# Patient Record
Sex: MALE | Race: WHITE | URBAN - METROPOLITAN AREA
[De-identification: names, ages, dates, MRNs, and addresses within clinical notes are randomized per-mention and may not be internally consistent; named-entity substitution may affect disease eponyms.]

---

## 2020-12-23 ENCOUNTER — OUTPATIENT (OUTPATIENT)
Dept: OUTPATIENT SERVICES | Facility: HOSPITAL | Age: 85
LOS: 1 days | End: 2020-12-23
Payer: MEDICARE

## 2020-12-23 DIAGNOSIS — K21.9 GASTRO-ESOPHAGEAL REFLUX DISEASE WITHOUT ESOPHAGITIS: ICD-10-CM

## 2020-12-23 PROCEDURE — 74240 X-RAY XM UPR GI TRC 1CNTRST: CPT

## 2020-12-23 PROCEDURE — 74240 X-RAY XM UPR GI TRC 1CNTRST: CPT | Mod: 26

## 2020-12-23 PROCEDURE — 74220 X-RAY XM ESOPHAGUS 1CNTRST: CPT

## 2023-05-03 ENCOUNTER — INPATIENT (INPATIENT)
Facility: HOSPITAL | Age: 88
LOS: 12 days | Discharge: HOME | DRG: 881 | End: 2023-05-16
Attending: PSYCHIATRY & NEUROLOGY | Admitting: PSYCHIATRY & NEUROLOGY
Payer: MEDICARE

## 2023-05-03 VITALS — WEIGHT: 145.06 LBS | HEIGHT: 68 IN | RESPIRATION RATE: 14 BRPM | TEMPERATURE: 98 F | OXYGEN SATURATION: 100 %

## 2023-05-03 DIAGNOSIS — R45.851 SUICIDAL IDEATIONS: ICD-10-CM

## 2023-05-03 PROCEDURE — 82607 VITAMIN B-12: CPT

## 2023-05-03 PROCEDURE — 85730 THROMBOPLASTIN TIME PARTIAL: CPT

## 2023-05-03 PROCEDURE — 83735 ASSAY OF MAGNESIUM: CPT

## 2023-05-03 PROCEDURE — 97116 GAIT TRAINING THERAPY: CPT | Mod: GP

## 2023-05-03 PROCEDURE — 82306 VITAMIN D 25 HYDROXY: CPT

## 2023-05-03 PROCEDURE — 85610 PROTHROMBIN TIME: CPT

## 2023-05-03 PROCEDURE — 83036 HEMOGLOBIN GLYCOSYLATED A1C: CPT

## 2023-05-03 PROCEDURE — 36415 COLL VENOUS BLD VENIPUNCTURE: CPT

## 2023-05-03 PROCEDURE — 80053 COMPREHEN METABOLIC PANEL: CPT

## 2023-05-03 PROCEDURE — 84443 ASSAY THYROID STIM HORMONE: CPT

## 2023-05-03 PROCEDURE — 97162 PT EVAL MOD COMPLEX 30 MIN: CPT | Mod: GP

## 2023-05-03 PROCEDURE — 80061 LIPID PANEL: CPT

## 2023-05-03 PROCEDURE — 85025 COMPLETE CBC W/AUTO DIFF WBC: CPT

## 2023-05-03 PROCEDURE — 99223 1ST HOSP IP/OBS HIGH 75: CPT

## 2023-05-03 PROCEDURE — 82746 ASSAY OF FOLIC ACID SERUM: CPT

## 2023-05-03 PROCEDURE — 70450 CT HEAD/BRAIN W/O DYE: CPT

## 2023-05-03 PROCEDURE — 93005 ELECTROCARDIOGRAM TRACING: CPT

## 2023-05-03 RX ORDER — METOPROLOL TARTRATE 50 MG
25 TABLET ORAL DAILY
Refills: 0 | Status: DISCONTINUED | OUTPATIENT
Start: 2023-05-03 | End: 2023-05-16

## 2023-05-03 RX ORDER — CLOPIDOGREL BISULFATE 75 MG/1
75 TABLET, FILM COATED ORAL DAILY
Refills: 0 | Status: DISCONTINUED | OUTPATIENT
Start: 2023-05-03 | End: 2023-05-16

## 2023-05-03 RX ORDER — TRAZODONE HCL 50 MG
25 TABLET ORAL AT BEDTIME
Refills: 0 | Status: DISCONTINUED | OUTPATIENT
Start: 2023-05-03 | End: 2023-05-05

## 2023-05-03 RX ORDER — FINASTERIDE 5 MG/1
5 TABLET, FILM COATED ORAL DAILY
Refills: 0 | Status: DISCONTINUED | OUTPATIENT
Start: 2023-05-03 | End: 2023-05-16

## 2023-05-03 RX ORDER — SIMVASTATIN 20 MG/1
40 TABLET, FILM COATED ORAL AT BEDTIME
Refills: 0 | Status: DISCONTINUED | OUTPATIENT
Start: 2023-05-03 | End: 2023-05-16

## 2023-05-03 RX ORDER — TAMSULOSIN HYDROCHLORIDE 0.4 MG/1
0.4 CAPSULE ORAL AT BEDTIME
Refills: 0 | Status: DISCONTINUED | OUTPATIENT
Start: 2023-05-03 | End: 2023-05-16

## 2023-05-03 RX ORDER — HYDROXYZINE HCL 10 MG
25 TABLET ORAL THREE TIMES A DAY
Refills: 0 | Status: DISCONTINUED | OUTPATIENT
Start: 2023-05-03 | End: 2023-05-05

## 2023-05-03 RX ORDER — SERTRALINE 25 MG/1
25 TABLET, FILM COATED ORAL AT BEDTIME
Refills: 0 | Status: DISCONTINUED | OUTPATIENT
Start: 2023-05-03 | End: 2023-05-05

## 2023-05-03 RX ORDER — APIXABAN 2.5 MG/1
2.5 TABLET, FILM COATED ORAL EVERY 12 HOURS
Refills: 0 | Status: DISCONTINUED | OUTPATIENT
Start: 2023-05-03 | End: 2023-05-16

## 2023-05-03 RX ADMIN — TAMSULOSIN HYDROCHLORIDE 0.4 MILLIGRAM(S): 0.4 CAPSULE ORAL at 21:22

## 2023-05-03 RX ADMIN — SIMVASTATIN 40 MILLIGRAM(S): 20 TABLET, FILM COATED ORAL at 21:23

## 2023-05-03 RX ADMIN — APIXABAN 2.5 MILLIGRAM(S): 2.5 TABLET, FILM COATED ORAL at 21:25

## 2023-05-03 RX ADMIN — CLOPIDOGREL BISULFATE 75 MILLIGRAM(S): 75 TABLET, FILM COATED ORAL at 21:36

## 2023-05-03 RX ADMIN — SERTRALINE 25 MILLIGRAM(S): 25 TABLET, FILM COATED ORAL at 21:21

## 2023-05-03 RX ADMIN — Medication 25 MILLIGRAM(S): at 21:23

## 2023-05-03 NOTE — BH INPATIENT PSYCHIATRY ASSESSMENT NOTE - CURRENT MEDICATION
MEDICATIONS  (STANDING):  apixaban 2.5 milliGRAM(s) Oral every 12 hours  clopidogrel Tablet 75 milliGRAM(s) Oral daily  finasteride 5 milliGRAM(s) Oral daily  metoprolol succinate ER 25 milliGRAM(s) Oral daily  sertraline 25 milliGRAM(s) Oral at bedtime  simvastatin 40 milliGRAM(s) Oral at bedtime  tamsulosin 0.4 milliGRAM(s) Oral at bedtime  traZODone 25 milliGRAM(s) Oral at bedtime    MEDICATIONS  (PRN):  hydrOXYzine hydrochloride 25 milliGRAM(s) Oral three times a day PRN anxiety

## 2023-05-03 NOTE — BH SAFETY PLAN - WARNING SIGN 1
Triggers to suicidal thoughts: Everything became too much. I couldn't function. I couldn't cook for her and that bothered me.   Wife has dementia and I take care of her. Stress from my car and selling the house.

## 2023-05-03 NOTE — BH PATIENT PROFILE - FALL HARM RISK - RISK INTERVENTIONS
Assistance OOB with selected safe patient handling equipment/Assistance with ambulation/Communicate Fall Risk and Risk Factors to all staff, patient, and family/Discuss with provider need for PT consult/Monitor gait and stability/Reinforce activity limits and safety measures with patient and family/Sit up slowly, dangle for a short time, stand at bedside before walking/Visual Cue: Yellow wristband/Bed in lowest position, wheels locked, appropriate side rails in place/Call bell, personal items and telephone in reach/Instruct patient to call for assistance before getting out of bed or chair/Non-slip footwear when patient is out of bed/[place] to call system/Physically safe environment - no spills, clutter or unnecessary equipment/Purposeful Proactive Rounding/Room/bathroom lighting operational, light cord in reach

## 2023-05-03 NOTE — BH INPATIENT PSYCHIATRY ASSESSMENT NOTE - NSBHASSESSSUMMFT_PSY_ALL_CORE
89 yr old  male with hx of prior SA now with increased anxiety , depression in the context of caring for his wife with dementia and of his gradual lost of independence and failing health.

## 2023-05-03 NOTE — BH INPATIENT PSYCHIATRY ASSESSMENT NOTE - NSBHCHARTREVIEWVS_PSY_A_CORE FT
Vital Signs Last 24 Hrs  T(C): 36.8 (05-03-23 @ 15:05), Max: 36.8 (05-03-23 @ 15:05)  T(F): 98.2 (05-03-23 @ 15:05), Max: 98.2 (05-03-23 @ 15:05)  HR: --  BP: --  BP(mean): --  RR: 14 (05-03-23 @ 15:05) (14 - 14)  SpO2: 100% (05-03-23 @ 15:05) (100% - 100%)    Orthostatic VS  05-03-23 @ 15:05  Lying BP: --/-- HR: --  Sitting BP: 125/77 HR: 69  Standing BP: 100/64 HR: 83  Site: --  Mode: --

## 2023-05-03 NOTE — BH INPATIENT PSYCHIATRY ASSESSMENT NOTE - RISK ASSESSMENT
acute :moderate  with depressed and anxious mood,   mitigating: pt denies any intent while in the hospital   protective: cares for wife with dementia , has adult children  chronic: prior psych hospitalization , SA, wife with dementia

## 2023-05-03 NOTE — BH INPATIENT PSYCHIATRY ASSESSMENT NOTE - HPI (INCLUDE ILLNESS QUALITY, SEVERITY, DURATION, TIMING, CONTEXT, MODIFYING FACTORS, ASSOCIATED SIGNS AND SYMPTOMS)
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 ,Pt was being psych cleared to return home, but the pt reported to  the ED RN that "he would kill himself if he is discharged." The daughter reportedly said to the ED staff that "pt had a suicide attempt 15 yrs ago by OD on medication and was hospitalized at [facility]. Pt found to be oriented to self, place , knows who the president of the [place] , denies any hallucinations, no delusions elicited. Pt endorses having suicidal ideation as he cannot change the stressors in his life such as his wife with dementia, his declining daily function, decreased independence and not having much help to function on a daily basis.   Pt with hx of CV stroke x3, atrial fib, colon cancer, CAD, esophageal stenosis reflux esophagitis, thoracic ascending aortic aneurysm 4.8cm, TIA with right arm weakness 2013aller: Jenny.

## 2023-05-04 DIAGNOSIS — Z96.642 PRESENCE OF LEFT ARTIFICIAL HIP JOINT: Chronic | ICD-10-CM

## 2023-05-04 LAB
A1C WITH ESTIMATED AVERAGE GLUCOSE RESULT: 5.6 % — SIGNIFICANT CHANGE UP (ref 4–5.6)
ALBUMIN SERPL ELPH-MCNC: 3.2 G/DL — LOW (ref 3.3–5)
ALP SERPL-CCNC: 49 U/L — SIGNIFICANT CHANGE UP (ref 40–120)
ALT FLD-CCNC: 13 U/L — SIGNIFICANT CHANGE UP (ref 12–78)
ANION GAP SERPL CALC-SCNC: 4 MMOL/L — LOW (ref 5–17)
APTT BLD: 29 SEC — SIGNIFICANT CHANGE UP (ref 27.5–35.5)
AST SERPL-CCNC: 13 U/L — LOW (ref 15–37)
BASOPHILS # BLD AUTO: 0.04 K/UL — SIGNIFICANT CHANGE UP (ref 0–0.2)
BASOPHILS NFR BLD AUTO: 0.7 % — SIGNIFICANT CHANGE UP (ref 0–2)
BILIRUB SERPL-MCNC: 0.5 MG/DL — SIGNIFICANT CHANGE UP (ref 0.2–1.2)
BUN SERPL-MCNC: 36 MG/DL — HIGH (ref 7–23)
CALCIUM SERPL-MCNC: 8.7 MG/DL — SIGNIFICANT CHANGE UP (ref 8.5–10.1)
CHLORIDE SERPL-SCNC: 109 MMOL/L — HIGH (ref 96–108)
CHOLEST SERPL-MCNC: 139 MG/DL — SIGNIFICANT CHANGE UP
CO2 SERPL-SCNC: 25 MMOL/L — SIGNIFICANT CHANGE UP (ref 22–31)
CREAT SERPL-MCNC: 2.3 MG/DL — HIGH (ref 0.5–1.3)
EGFR: 26 ML/MIN/1.73M2 — LOW
EOSINOPHIL # BLD AUTO: 0.14 K/UL — SIGNIFICANT CHANGE UP (ref 0–0.5)
EOSINOPHIL NFR BLD AUTO: 2.5 % — SIGNIFICANT CHANGE UP (ref 0–6)
ESTIMATED AVERAGE GLUCOSE: 114 MG/DL — SIGNIFICANT CHANGE UP (ref 68–114)
GLUCOSE SERPL-MCNC: 141 MG/DL — HIGH (ref 70–99)
HCT VFR BLD CALC: 39.1 % — SIGNIFICANT CHANGE UP (ref 39–50)
HDLC SERPL-MCNC: 52 MG/DL — SIGNIFICANT CHANGE UP
HGB BLD-MCNC: 12.6 G/DL — LOW (ref 13–17)
IMM GRANULOCYTES NFR BLD AUTO: 0.2 % — SIGNIFICANT CHANGE UP (ref 0–0.9)
INR BLD: 1.14 RATIO — SIGNIFICANT CHANGE UP (ref 0.88–1.16)
LIPID PNL WITH DIRECT LDL SERPL: 72 MG/DL — SIGNIFICANT CHANGE UP
LYMPHOCYTES # BLD AUTO: 1.27 K/UL — SIGNIFICANT CHANGE UP (ref 1–3.3)
LYMPHOCYTES # BLD AUTO: 22.3 % — SIGNIFICANT CHANGE UP (ref 13–44)
MAGNESIUM SERPL-MCNC: 2.4 MG/DL — SIGNIFICANT CHANGE UP (ref 1.6–2.6)
MCHC RBC-ENTMCNC: 32.2 GM/DL — SIGNIFICANT CHANGE UP (ref 32–36)
MCHC RBC-ENTMCNC: 32.7 PG — SIGNIFICANT CHANGE UP (ref 27–34)
MCV RBC AUTO: 101.6 FL — HIGH (ref 80–100)
MONOCYTES # BLD AUTO: 0.41 K/UL — SIGNIFICANT CHANGE UP (ref 0–0.9)
MONOCYTES NFR BLD AUTO: 7.2 % — SIGNIFICANT CHANGE UP (ref 2–14)
NEUTROPHILS # BLD AUTO: 3.83 K/UL — SIGNIFICANT CHANGE UP (ref 1.8–7.4)
NEUTROPHILS NFR BLD AUTO: 67.1 % — SIGNIFICANT CHANGE UP (ref 43–77)
NON HDL CHOLESTEROL: 87 MG/DL — SIGNIFICANT CHANGE UP
PLATELET # BLD AUTO: 165 K/UL — SIGNIFICANT CHANGE UP (ref 150–400)
POTASSIUM SERPL-MCNC: 4.2 MMOL/L — SIGNIFICANT CHANGE UP (ref 3.5–5.3)
POTASSIUM SERPL-SCNC: 4.2 MMOL/L — SIGNIFICANT CHANGE UP (ref 3.5–5.3)
PROT SERPL-MCNC: 6.4 GM/DL — SIGNIFICANT CHANGE UP (ref 6–8.3)
PROTHROM AB SERPL-ACNC: 13.3 SEC — SIGNIFICANT CHANGE UP (ref 10.5–13.4)
RBC # BLD: 3.85 M/UL — LOW (ref 4.2–5.8)
RBC # FLD: 12.8 % — SIGNIFICANT CHANGE UP (ref 10.3–14.5)
SODIUM SERPL-SCNC: 138 MMOL/L — SIGNIFICANT CHANGE UP (ref 135–145)
TRIGL SERPL-MCNC: 77 MG/DL — SIGNIFICANT CHANGE UP
TSH SERPL-MCNC: 1.38 UU/ML — SIGNIFICANT CHANGE UP (ref 0.34–4.82)
WBC # BLD: 5.7 K/UL — SIGNIFICANT CHANGE UP (ref 3.8–10.5)
WBC # FLD AUTO: 5.7 K/UL — SIGNIFICANT CHANGE UP (ref 3.8–10.5)

## 2023-05-04 PROCEDURE — 93010 ELECTROCARDIOGRAM REPORT: CPT

## 2023-05-04 PROCEDURE — 99232 SBSQ HOSP IP/OBS MODERATE 35: CPT

## 2023-05-04 PROCEDURE — 99222 1ST HOSP IP/OBS MODERATE 55: CPT

## 2023-05-04 RX ORDER — LANOLIN ALCOHOL/MO/W.PET/CERES
5 CREAM (GRAM) TOPICAL AT BEDTIME
Refills: 0 | Status: DISCONTINUED | OUTPATIENT
Start: 2023-05-04 | End: 2023-05-09

## 2023-05-04 RX ADMIN — SIMVASTATIN 40 MILLIGRAM(S): 20 TABLET, FILM COATED ORAL at 20:35

## 2023-05-04 RX ADMIN — Medication 5 MILLIGRAM(S): at 20:34

## 2023-05-04 RX ADMIN — APIXABAN 2.5 MILLIGRAM(S): 2.5 TABLET, FILM COATED ORAL at 20:35

## 2023-05-04 RX ADMIN — APIXABAN 2.5 MILLIGRAM(S): 2.5 TABLET, FILM COATED ORAL at 10:13

## 2023-05-04 RX ADMIN — CLOPIDOGREL BISULFATE 75 MILLIGRAM(S): 75 TABLET, FILM COATED ORAL at 10:12

## 2023-05-04 RX ADMIN — FINASTERIDE 5 MILLIGRAM(S): 5 TABLET, FILM COATED ORAL at 10:13

## 2023-05-04 RX ADMIN — TAMSULOSIN HYDROCHLORIDE 0.4 MILLIGRAM(S): 0.4 CAPSULE ORAL at 20:34

## 2023-05-04 RX ADMIN — SERTRALINE 25 MILLIGRAM(S): 25 TABLET, FILM COATED ORAL at 20:35

## 2023-05-04 RX ADMIN — Medication 25 MILLIGRAM(S): at 20:35

## 2023-05-04 NOTE — DIETITIAN INITIAL EVALUATION ADULT - ADD RECOMMEND
1) Liberalize diet to regular to maximize caloric and nutrient intake.  2) Provide Ensure + HP shake BID to optimize nutritional needs (provides 350 kcal, 20g protein/ shake)  3) Encourage protein-rich foods, maximize food preferences  4) Monitor bowel movements, if no BM for >3 days, consider implementing bowel regimen.  5) MVI w/ minerals daily to ensure 100% RDA met  6) Consider adding thiamine 100 mg daily 2/2 poor PO intake/ malnutrition  7) Obtain weekly wt to track/trend changes  RD will continue to monitor PO intake, labs, hydration, and wt prn.

## 2023-05-04 NOTE — H&P ADULT - NS ATTEND AMEND GEN_ALL_CORE FT
I agree with the above assessment and plan  Medicine to follow given pt with EKG and labs pending, pt a transfer from [place] and highly uncooperative with exam and history taking.

## 2023-05-04 NOTE — DIETITIAN INITIAL EVALUATION ADULT - OTHER INFO
89 y/o male with PMHx of A. 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     Pt w/ continued decreased appetite since admission. Reports of consuming ~75% breakfast tray this morning (oatmeal, pancakes) - on DASH diet. Current wt stated at 165# however endorses possible wt loss recently. Current wt doc'd at 145#, 20# wt loss/ 12.12% wt loss x ~1 month - severe and clinically signficiant. Appeared thin and frail upon assessment. Unable to conduct NFPE 2/2 current mental status however shows visible signs of muscle/fat wasting at this time. Will trial Ensure + HP shake BID to optimize nutritional needs (provides 350 kcal, 20g protein/ shake) - pt receptive. Due to decreased appetite, suggest to lberalize diet to regular to maximize caloric and nutrient intake. See other recommendations below.

## 2023-05-04 NOTE — DIETITIAN INITIAL EVALUATION ADULT - ORAL INTAKE PTA/DIET HISTORY
Pt from home, was at [facility] and transferred to . Reports of never being a "big eater" however over the past couple weeks has noticed a decrease in appetite, likely meeting <50% ENN. Likes to drink nutrition supplement at home however unable to recall name of it

## 2023-05-04 NOTE — BH SOCIAL WORK INITIAL PSYCHOSOCIAL EVALUATION - NSHIGHRISKBEHFT_PSY_ALL_CORE
Patient has history of suicide attempt 15 years ago by OD for which he was hospitalized at [facility]

## 2023-05-04 NOTE — H&P ADULT - NSHPPHYSICALEXAM_GEN_ALL_CORE
Vital Signs Last 24 Hrs  T(C): 36.8 (03 May 2023 15:05), Max: 36.8 (03 May 2023 15:05)  T(F): 98.2 (03 May 2023 15:05), Max: 98.2 (03 May 2023 15:05)  HR: 69  BP: 125/77  RR: 14 (03 May 2023 15:05) (14 - 14)  SpO2: 100% (03 May 2023 15:05) (100% - 100%)

## 2023-05-04 NOTE — DIETITIAN INITIAL EVALUATION ADULT - REASON
NFPE inappropriate at this time 2/2 current mental state; shows visible signs of mod/sev muscle/fat wasting

## 2023-05-04 NOTE — PHYSICAL THERAPY INITIAL EVALUATION ADULT - PERTINENT HX OF CURRENT PROBLEM, REHAB EVAL
increasing difficulty caring for wife with dementia ,trouble completing ADLs including laundry, meal prep causing increasing anxiety +/- suicidal ideation

## 2023-05-04 NOTE — H&P ADULT - NSICDXPASTMEDICALHX_GEN_ALL_CORE_FT
PAST MEDICAL HISTORY:  CAD (coronary artery disease)     Cerebrovascular accident (CVA)     Colon cancer     Esophageal reflux     Esophageal stenosis     Fibrillation, atrial     History of TIAs     Hyperlipidemia     Major depression, chronic     Thoracic ascending aortic aneurysm

## 2023-05-04 NOTE — DIETITIAN INITIAL EVALUATION ADULT - MALNUTRITION
Pt meets criteria for severe protein-calorie malnutrition in context of chronic disease on social/environmental circumstances

## 2023-05-04 NOTE — BH TREATMENT PLAN - NSTXDEPRESINTERRN_PSY_ALL_CORE
Establish trust/therapeutic rapport to encourage ventilation of feelings.  Provide emotional support.  Encourage medication compliance, provide education, and monitor effects.  Encourage participation in unit activities with emphasis on coping/stress management.  Maintain safe environment.  Assess mood/suicidality Q shift, document and report changes.

## 2023-05-04 NOTE — BH INPATIENT PSYCHIATRY PROGRESS NOTE - NSBHMETABOLIC_PSY_ALL_CORE_FT
BMI: BMI (kg/m2): 22.1 (05-03-23 @ 15:05)  HbA1c:   Glucose:   BP: --  Lipid Panel: Date/Time: 05-04-23 @ 08:33  Cholesterol, Serum: 139  Direct LDL: --  HDL Cholesterol, Serum: 52  Total Cholesterol/HDL Ration Measurement: --  Triglycerides, Serum: 77

## 2023-05-04 NOTE — H&P ADULT - HISTORY OF PRESENT ILLNESS
89 y/o male with PMHx of RAND. Tori on Eliquis, CVA x 3 with residual right arm weakness, Colon cancer, CAD s/p stents 2022, Esophageal stenosis, HLD, Esophagitis, TIA, thoracic ascending aortic aneurysm ( 4.8 cm) and depression admitted to inpatient psychiatry for suicidal ideation.  Per medical records, patient was initially BIB his family to [facility] ED  with the cc of decreased daily function ie of cooking , cleaning, caring for himself and his wife, having  increased anxiety, worrying,  and depression. Pt was being psych cleared to return home, but the pt reported to  the ED RN that "he would kill himself if he is discharged."   Medicine was consulted for management of acute/ chronic medical conditions. At time of exam, pt denies any active complains. Denies CP, SOB, palpitations, cough, abd pain, fever or chills

## 2023-05-04 NOTE — BH INPATIENT PSYCHIATRY PROGRESS NOTE - NSBHFUPINTERVALHXFT_PSY_A_CORE
Pt claiming to be more anxious as he is "missing my wife" . He indicated that he was considering getting an aide but was unsuccessful in getting any help ,was having difficulty with "food shopping getting the laundry done.", was getting some  delivery but "had trouble with the microwave and was trying to portion out the food so it would be enough ." Pt declining the need for a nursing home or assessed living , and wanting to remain in his home.   Pt claiming anxiety "worrying a lot " but denies at this time any suicidal intent "if so who would care for my wife?"

## 2023-05-04 NOTE — BH SOCIAL WORK INITIAL PSYCHOSOCIAL EVALUATION - OTHER PAST PSYCHIATRIC HISTORY (INCLUDE DETAILS REGARDING ONSET, COURSE OF ILLNESS, INPATIENT/OUTPATIENT TREATMENT)
Patient was admitted to  on transfer from [facility] where he presented for evaluation of depression with suicidal thoughts. Patient is a 89 year old, , white male who stated in ED "I will kill myself if I go home." Patient is struggling with decreased ability to function on a daily basis with cooking, cleaning and shopping for himself and his wife due to his age, with the complicating and severe stressor of having to care for his wife who is struggling with dementia.  Patient with his own significant medical problems as well. Patient does not see possibility of his circumstances improving, leaving him with feelings of hopelessness, helplessness and wanting to die.

## 2023-05-04 NOTE — BH INPATIENT PSYCHIATRY PROGRESS NOTE - NSBHCHARTREVIEWVS_PSY_A_CORE FT
Vital Signs Last 24 Hrs  T(C): 36.4 (05-04-23 @ 07:55), Max: 36.8 (05-03-23 @ 15:05)  T(F): 97.6 (05-04-23 @ 07:55), Max: 98.2 (05-03-23 @ 15:05)  HR: --  BP: --  BP(mean): --  RR: 16 (05-04-23 @ 07:55) (14 - 16)  SpO2: 98% (05-04-23 @ 07:55) (98% - 100%)    Orthostatic VS  05-04-23 @ 07:55  Lying BP: --/-- HR: --  Sitting BP: 148/79 HR: 80  Standing BP: 136/88 HR: 84  Site: upper right arm  Mode: electronic  Orthostatic VS  05-03-23 @ 15:05  Lying BP: --/-- HR: --  Sitting BP: 125/77 HR: 69  Standing BP: 100/64 HR: 83  Site: --  Mode: --

## 2023-05-04 NOTE — H&P ADULT - ASSESSMENT
89 y/o male with PMHx of A. 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    # Depression   - Management per psych     # CAD s/p stents   # A. Fib   - c/w Elequis 2.5 mg q12hrs  - c/w Metoprolol XL 25 mg daily   - c/w Plavix 75 mg daily  - EKG in am     # HLD  - c/w Zocor 40 mg qhs  - Lipid panel     # BPH  - c/w Flomax and Proscar     Will sign off, reconsult PRN    D/w Dr. Bocanegra        89 y/o male with PMHx of A. 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    # Depression   - Management per psych     # CAD s/p stents   # A. Fib   - c/w Elequis 2.5 mg q12hrs  - c/w Metoprolol XL 25 mg daily   - c/w Plavix 75 mg daily  - EKG in am     # HLD  - c/w Zocor 40 mg qhs  - Lipid panel     # BPH  - c/w Flomax and Proscar     Will continue to follow to assess EKG in AM and f/u lab work    D/w Dr. Bocanegra

## 2023-05-04 NOTE — DIETITIAN INITIAL EVALUATION ADULT - SIGNS/SYMPTOMS
AEB <75% ENN x >1 month, visible signs of mod to severe muscle/fat wasting AEB <75% ENN x >1 month, 12.12% wt loss x 1 month, visible signs of mod to severe muscle/fat wasting

## 2023-05-04 NOTE — BH INPATIENT PSYCHIATRY PROGRESS NOTE - CURRENT MEDICATION
MEDICATIONS  (STANDING):  apixaban 2.5 milliGRAM(s) Oral every 12 hours  clopidogrel Tablet 75 milliGRAM(s) Oral daily  finasteride 5 milliGRAM(s) Oral daily  melatonin 5 milliGRAM(s) Oral at bedtime  metoprolol succinate ER 25 milliGRAM(s) Oral daily  sertraline 25 milliGRAM(s) Oral at bedtime  simvastatin 40 milliGRAM(s) Oral at bedtime  tamsulosin 0.4 milliGRAM(s) Oral at bedtime  traZODone 25 milliGRAM(s) Oral at bedtime    MEDICATIONS  (PRN):  hydrOXYzine hydrochloride 25 milliGRAM(s) Oral three times a day PRN anxiety

## 2023-05-04 NOTE — DIETITIAN INITIAL EVALUATION ADULT - PERTINENT LABORATORY DATA
05-04    138  |  109<H>  |  36<H>  ----------------------------<  141<H>  4.2   |  25  |  2.30<H>    Ca    8.7      04 May 2023 08:33  Mg     2.4     05-04    TPro  6.4  /  Alb  3.2<L>  /  TBili  0.5  /  DBili  x   /  AST  13<L>  /  ALT  13  /  AlkPhos  49  05-04

## 2023-05-05 PROCEDURE — 99232 SBSQ HOSP IP/OBS MODERATE 35: CPT

## 2023-05-05 RX ORDER — SERTRALINE 25 MG/1
50 TABLET, FILM COATED ORAL DAILY
Refills: 0 | Status: DISCONTINUED | OUTPATIENT
Start: 2023-05-05 | End: 2023-05-05

## 2023-05-05 RX ORDER — MIRTAZAPINE 45 MG/1
7.5 TABLET, ORALLY DISINTEGRATING ORAL AT BEDTIME
Refills: 0 | Status: DISCONTINUED | OUTPATIENT
Start: 2023-05-05 | End: 2023-05-10

## 2023-05-05 RX ADMIN — Medication 5 MILLIGRAM(S): at 20:11

## 2023-05-05 RX ADMIN — SIMVASTATIN 40 MILLIGRAM(S): 20 TABLET, FILM COATED ORAL at 21:04

## 2023-05-05 RX ADMIN — Medication 25 MILLIGRAM(S): at 07:57

## 2023-05-05 RX ADMIN — Medication 25 MILLIGRAM(S): at 09:38

## 2023-05-05 RX ADMIN — FINASTERIDE 5 MILLIGRAM(S): 5 TABLET, FILM COATED ORAL at 09:38

## 2023-05-05 RX ADMIN — CLOPIDOGREL BISULFATE 75 MILLIGRAM(S): 75 TABLET, FILM COATED ORAL at 09:38

## 2023-05-05 RX ADMIN — Medication 0.5 MILLIGRAM(S): at 10:32

## 2023-05-05 RX ADMIN — Medication 0.5 MILLIGRAM(S): at 20:13

## 2023-05-05 RX ADMIN — APIXABAN 2.5 MILLIGRAM(S): 2.5 TABLET, FILM COATED ORAL at 21:03

## 2023-05-05 RX ADMIN — APIXABAN 2.5 MILLIGRAM(S): 2.5 TABLET, FILM COATED ORAL at 09:38

## 2023-05-05 RX ADMIN — MIRTAZAPINE 7.5 MILLIGRAM(S): 45 TABLET, ORALLY DISINTEGRATING ORAL at 20:12

## 2023-05-05 RX ADMIN — TAMSULOSIN HYDROCHLORIDE 0.4 MILLIGRAM(S): 0.4 CAPSULE ORAL at 20:12

## 2023-05-05 NOTE — BH INPATIENT PSYCHIATRY PROGRESS NOTE - NSBHMETABOLIC_PSY_ALL_CORE_FT
BMI: BMI (kg/m2): 22.1 (05-03-23 @ 15:05)  HbA1c: A1C with Estimated Average Glucose Result: 5.6 % (05-04-23 @ 08:33)    Glucose:   BP: --  Lipid Panel: Date/Time: 05-04-23 @ 08:33  Cholesterol, Serum: 139  Direct LDL: --  HDL Cholesterol, Serum: 52  Total Cholesterol/HDL Ration Measurement: --  Triglycerides, Serum: 77

## 2023-05-05 NOTE — BH INPATIENT PSYCHIATRY PROGRESS NOTE - CURRENT MEDICATION
MEDICATIONS  (STANDING):  apixaban 2.5 milliGRAM(s) Oral every 12 hours  clopidogrel Tablet 75 milliGRAM(s) Oral daily  finasteride 5 milliGRAM(s) Oral daily  melatonin 5 milliGRAM(s) Oral at bedtime  metoprolol succinate ER 25 milliGRAM(s) Oral daily  sertraline 50 milliGRAM(s) Oral daily  simvastatin 40 milliGRAM(s) Oral at bedtime  tamsulosin 0.4 milliGRAM(s) Oral at bedtime  traZODone 25 milliGRAM(s) Oral at bedtime    MEDICATIONS  (PRN):  hydrOXYzine hydrochloride 25 milliGRAM(s) Oral three times a day PRN anxiety  LORazepam     Tablet 0.5 milliGRAM(s) Oral two times a day PRN anxietyanxiety

## 2023-05-05 NOTE — PROGRESS NOTE ADULT - SUBJECTIVE AND OBJECTIVE BOX
CHIEF COMPLAINT: Anxiety/overwhelmed/SI    SUBJECTIVE/SIGNIFICANT INTERVAL EVENTS/OVERNIGHT EVENTS:    5/5: Patient seen and evaluated. No complaints except for anxiety and disinterest in things he used to enjoy. Denies fever, chills, HA, changes in vision, chest pain, SOB, nausea, vomiting, abdominal pain/discomfort.     Review of Systems: 14 Point review of systems reviewed and reported as negative unless otherwise stated above    FROM H&P:  "89 y/o male with PMHx of A. Fib on Eliquis, CVA x 3 with residual right arm weakness, Colon cancer, CAD s/p stents 2022, Esophageal stenosis, HLD, Esophagitis, TIA, thoracic ascending aortic aneurysm ( 4.8 cm) and depression admitted to inpatient psychiatry for suicidal ideation.  Per medical records, patient was initially BIB his family to [facility] ED  with the cc of decreased daily function ie of cooking , cleaning, caring for himself and his wife, having  increased anxiety, worrying,  and depression. Pt was being psych cleared to return home, but the pt reported to  the ED RN that "he would kill himself if he is discharged."   Medicine was consulted for management of acute/ chronic medical conditions. At time of exam, pt denies any active complains. Denies CP, SOB, palpitations, cough, abd pain, fever or chills "    PHYSICAL EXAM:    T(C): 36.6 (05-05-23 @ 07:34), Max: 36.6 (05-05-23 @ 07:34)  HR: --  BP: --  RR: 18 (05-05-23 @ 07:34) (18 - 18)  SpO2: 99% (05-05-23 @ 07:34) (99% - 99%)    General: AAOx3; NAD  Head: AT/NC  ENT: Moist Mucous Membranes; No Injury  Eyes: EOMI; PERRL  Neck: Non-tender; No JVD  CVS: RRR, S1&S2, Murmur +  Respiratory: Lungs CTA B/L; Normal Respiratory Effort  Abdomen/GI: Soft, non-tender, non-distended, no guarding, no rebound, normal bowel sounds  Extremities: No cyanosis, No clubbing,  MSK: No CVA tenderness, Normal ROM, No injury  Neuro: AAOx3, CNII-XII grossly intact, non-focal  Psych: Appropriate, Cooperative, Depression +, Anxiety +, Denies suicidal ideation  Skin: Clean, Dry and Intact      LABS:                          12.6   5.70  )-----------( 165      ( 04 May 2023 08:33 )             39.1     05-04    138  |  109<H>  |  36<H>  ----------------------------<  141<H>  4.2   |  25  |  2.30<H>    Ca    8.7      04 May 2023 08:33  Mg     2.4     05-04    TPro  6.4  /  Alb  3.2<L>  /  TBili  0.5  /  DBili  x   /  AST  13<L>  /  ALT  13  /  AlkPhos  49  05-04      CAPILLARY BLOOD GLUCOSE        Thyroid Stimulating Hormone, Serum: 1.38 uU/mL (05.04.23 @ 08:33) A1C with Estimated Average Glucose Result: 5.6:      EKG:  < from: 12 Lead ECG (05.04.23 @ 07:22) >  Diagnosis Line Normal sinus rhythm  Left axis deviation  Pulmonary disease pattern  Incomplete left bundle branch block  ST & T wave abnormality, consider lateral ischemia  Abnormal ECG    < end of copied text >            I personally reviewed labs, imaging, ekg, orders and vitals.    Discussed case with:  [X]RN  []MAG/ZEN  [X]Patient  []Family  []Specialist:        MEDICATIONS  (STANDING):  apixaban 2.5 milliGRAM(s) Oral every 12 hours  clopidogrel Tablet 75 milliGRAM(s) Oral daily  finasteride 5 milliGRAM(s) Oral daily  melatonin 5 milliGRAM(s) Oral at bedtime  metoprolol succinate ER 25 milliGRAM(s) Oral daily  mirtazapine 7.5 milliGRAM(s) Oral at bedtime  simvastatin 40 milliGRAM(s) Oral at bedtime  tamsulosin 0.4 milliGRAM(s) Oral at bedtime    MEDICATIONS  (PRN):  LORazepam     Tablet 0.5 milliGRAM(s) Oral two times a day PRN Anxiety

## 2023-05-05 NOTE — BH INPATIENT PSYCHIATRY PROGRESS NOTE - NSBHFUPINTERVALHXFT_PSY_A_CORE
Pt is somatically preoccupied ,  Pt wanting to remain at home to care for his wife but has physical limitations and get overwhelmed and not willing to have aides come in . Pt with difficult decisions   Pt claiming the ativan was helpful at 0.5mg

## 2023-05-05 NOTE — PROGRESS NOTE ADULT - ASSESSMENT
89 y/o male with PMHx of A. 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    # Depression   - Management per psych     # CAD s/p stents . No chest pain/Angina  # A. Fib   - c/w Elequis 2.5 mg q12hrs  - c/w Metoprolol XL 25 mg daily   - c/w Plavix 75 mg daily  - EKG noted. No chest pain, SOB or symptoms of ischemic process. Outpatient follow up.     # HLD  - c/w Zocor 40 mg qhs  - Lipid panel     # BPH  - c/w Flomax and Proscar     Patient currently medically stable. Medicine/Hospitalist to sign off. Re-call prn

## 2023-05-06 PROCEDURE — 99232 SBSQ HOSP IP/OBS MODERATE 35: CPT

## 2023-05-06 PROCEDURE — 70450 CT HEAD/BRAIN W/O DYE: CPT | Mod: 26

## 2023-05-06 RX ADMIN — SIMVASTATIN 40 MILLIGRAM(S): 20 TABLET, FILM COATED ORAL at 20:43

## 2023-05-06 RX ADMIN — Medication 25 MILLIGRAM(S): at 10:45

## 2023-05-06 RX ADMIN — APIXABAN 2.5 MILLIGRAM(S): 2.5 TABLET, FILM COATED ORAL at 20:43

## 2023-05-06 RX ADMIN — TAMSULOSIN HYDROCHLORIDE 0.4 MILLIGRAM(S): 0.4 CAPSULE ORAL at 20:43

## 2023-05-06 RX ADMIN — Medication 0.5 MILLIGRAM(S): at 11:37

## 2023-05-06 RX ADMIN — CLOPIDOGREL BISULFATE 75 MILLIGRAM(S): 75 TABLET, FILM COATED ORAL at 10:47

## 2023-05-06 RX ADMIN — FINASTERIDE 5 MILLIGRAM(S): 5 TABLET, FILM COATED ORAL at 10:44

## 2023-05-06 RX ADMIN — APIXABAN 2.5 MILLIGRAM(S): 2.5 TABLET, FILM COATED ORAL at 10:45

## 2023-05-06 RX ADMIN — MIRTAZAPINE 7.5 MILLIGRAM(S): 45 TABLET, ORALLY DISINTEGRATING ORAL at 20:43

## 2023-05-06 RX ADMIN — Medication 5 MILLIGRAM(S): at 20:43

## 2023-05-06 NOTE — BH INPATIENT PSYCHIATRY PROGRESS NOTE - NSBHFUPINTERVALHXFT_PSY_A_CORE
05/06/2023: Covering MD: Patient was seen today AM, chat reviewed and discussed in team. He seemed anxious, and pre-occupied with bodily symptoms, e.g. feels nervous, feels shaky and not able to talk, but seems talking well with writer. CT-head ordered to r/o organic causes. No meds changes for now.      Pt is somatically preoccupied ,  Pt wanting to remain at home to care for his wife but has physical limitations and get overwhelmed and not willing to have aides come in . Pt with difficult decisions   Pt claiming the ativan was helpful at 0.5mg

## 2023-05-06 NOTE — BH INPATIENT PSYCHIATRY PROGRESS NOTE - NSBHCHARTREVIEWVS_PSY_A_CORE FT
Vital Signs Last 24 Hrs  T(C): --  T(F): --  HR: --  BP: --  BP(mean): --  RR: --  SpO2: --    Orthostatic VS  05-05-23 @ 07:34  Lying BP: --/-- HR: --  Sitting BP: 137/81 HR: 73  Standing BP: 146/93 HR: 108  Site: upper right arm  Mode: electronic

## 2023-05-06 NOTE — BH INPATIENT PSYCHIATRY PROGRESS NOTE - CURRENT MEDICATION
MEDICATIONS  (STANDING):  apixaban 2.5 milliGRAM(s) Oral every 12 hours  clopidogrel Tablet 75 milliGRAM(s) Oral daily  finasteride 5 milliGRAM(s) Oral daily  melatonin 5 milliGRAM(s) Oral at bedtime  metoprolol succinate ER 25 milliGRAM(s) Oral daily  mirtazapine 7.5 milliGRAM(s) Oral at bedtime  simvastatin 40 milliGRAM(s) Oral at bedtime  tamsulosin 0.4 milliGRAM(s) Oral at bedtime    MEDICATIONS  (PRN):  LORazepam     Tablet 0.5 milliGRAM(s) Oral two times a day PRN Anxiety

## 2023-05-07 LAB
24R-OH-CALCIDIOL SERPL-MCNC: 34.9 NG/ML — SIGNIFICANT CHANGE UP (ref 30–80)
FOLATE SERPL-MCNC: 9.6 NG/ML — SIGNIFICANT CHANGE UP
VIT B12 SERPL-MCNC: 453 PG/ML — SIGNIFICANT CHANGE UP (ref 232–1245)

## 2023-05-07 PROCEDURE — 99232 SBSQ HOSP IP/OBS MODERATE 35: CPT

## 2023-05-07 RX ORDER — SENNA PLUS 8.6 MG/1
2 TABLET ORAL AT BEDTIME
Refills: 0 | Status: DISCONTINUED | OUTPATIENT
Start: 2023-05-07 | End: 2023-05-16

## 2023-05-07 RX ADMIN — Medication 0.5 MILLIGRAM(S): at 09:53

## 2023-05-07 RX ADMIN — Medication 25 MILLIGRAM(S): at 09:53

## 2023-05-07 RX ADMIN — TAMSULOSIN HYDROCHLORIDE 0.4 MILLIGRAM(S): 0.4 CAPSULE ORAL at 20:51

## 2023-05-07 RX ADMIN — FINASTERIDE 5 MILLIGRAM(S): 5 TABLET, FILM COATED ORAL at 09:53

## 2023-05-07 RX ADMIN — MIRTAZAPINE 7.5 MILLIGRAM(S): 45 TABLET, ORALLY DISINTEGRATING ORAL at 20:51

## 2023-05-07 RX ADMIN — Medication 5 MILLIGRAM(S): at 20:51

## 2023-05-07 RX ADMIN — CLOPIDOGREL BISULFATE 75 MILLIGRAM(S): 75 TABLET, FILM COATED ORAL at 09:53

## 2023-05-07 RX ADMIN — SIMVASTATIN 40 MILLIGRAM(S): 20 TABLET, FILM COATED ORAL at 20:51

## 2023-05-07 RX ADMIN — APIXABAN 2.5 MILLIGRAM(S): 2.5 TABLET, FILM COATED ORAL at 20:51

## 2023-05-07 RX ADMIN — APIXABAN 2.5 MILLIGRAM(S): 2.5 TABLET, FILM COATED ORAL at 09:53

## 2023-05-07 RX ADMIN — Medication 0.5 MILLIGRAM(S): at 20:50

## 2023-05-07 RX ADMIN — SENNA PLUS 2 TABLET(S): 8.6 TABLET ORAL at 22:42

## 2023-05-07 NOTE — BH INPATIENT PSYCHIATRY PROGRESS NOTE - NSBHFUPINTERVALHXFT_PSY_A_CORE
05/06/2023: Patient was seen today AM, chart reviewed and discussed in team. Mood seems OK, not depressed or suicidal at this time. Had a good conversation, endorses that he is retired now, looks after his wife of 40 years, who has Dementia, she is ambulatory and able to taken care of herself except her memory and at times forgets to put the coffee in the pot. 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 He now feels anxious, nervous and recently sold a house in [place] he owned for 20 years and used to live there from Jan till May every year. He has 2 son and daughter, has good relation with them. No acute issues at thia time. To continue with Remeron, not S/H and seems to have lower risk for suicide at this time.        05/06/2023: Covering MD: Patient was seen today AM, chat reviewed and discussed in team. He seemed anxious, and pre-occupied with bodily symptoms, e.g. feels nervous, feels shaky and not able to talk, but seems talking well with writer. CT-head ordered to r/o organic causes. No meds changes for now.      Pt is somatically preoccupied ,  Pt wanting to remain at home to care for his wife but has physical limitations and get overwhelmed and not willing to have aides come in . Pt with difficult decisions   Pt claiming the ativan was helpful at 0.5mg

## 2023-05-08 LAB
ALBUMIN SERPL ELPH-MCNC: 2.9 G/DL — LOW (ref 3.3–5)
ALP SERPL-CCNC: 44 U/L — SIGNIFICANT CHANGE UP (ref 40–120)
ALT FLD-CCNC: 15 U/L — SIGNIFICANT CHANGE UP (ref 12–78)
ANION GAP SERPL CALC-SCNC: 2 MMOL/L — LOW (ref 5–17)
AST SERPL-CCNC: 8 U/L — LOW (ref 15–37)
BILIRUB SERPL-MCNC: 0.4 MG/DL — SIGNIFICANT CHANGE UP (ref 0.2–1.2)
BUN SERPL-MCNC: 40 MG/DL — HIGH (ref 7–23)
CALCIUM SERPL-MCNC: 8.4 MG/DL — LOW (ref 8.5–10.1)
CHLORIDE SERPL-SCNC: 114 MMOL/L — HIGH (ref 96–108)
CO2 SERPL-SCNC: 26 MMOL/L — SIGNIFICANT CHANGE UP (ref 22–31)
CREAT SERPL-MCNC: 2.32 MG/DL — HIGH (ref 0.5–1.3)
EGFR: 26 ML/MIN/1.73M2 — LOW
GLUCOSE SERPL-MCNC: 91 MG/DL — SIGNIFICANT CHANGE UP (ref 70–99)
POTASSIUM SERPL-MCNC: 5 MMOL/L — SIGNIFICANT CHANGE UP (ref 3.5–5.3)
POTASSIUM SERPL-SCNC: 5 MMOL/L — SIGNIFICANT CHANGE UP (ref 3.5–5.3)
PROT SERPL-MCNC: 5.9 GM/DL — LOW (ref 6–8.3)
SODIUM SERPL-SCNC: 142 MMOL/L — SIGNIFICANT CHANGE UP (ref 135–145)

## 2023-05-08 PROCEDURE — 99232 SBSQ HOSP IP/OBS MODERATE 35: CPT

## 2023-05-08 RX ADMIN — SIMVASTATIN 40 MILLIGRAM(S): 20 TABLET, FILM COATED ORAL at 20:18

## 2023-05-08 RX ADMIN — TAMSULOSIN HYDROCHLORIDE 0.4 MILLIGRAM(S): 0.4 CAPSULE ORAL at 20:18

## 2023-05-08 RX ADMIN — Medication 0.5 MILLIGRAM(S): at 11:20

## 2023-05-08 RX ADMIN — APIXABAN 2.5 MILLIGRAM(S): 2.5 TABLET, FILM COATED ORAL at 09:50

## 2023-05-08 RX ADMIN — CLOPIDOGREL BISULFATE 75 MILLIGRAM(S): 75 TABLET, FILM COATED ORAL at 09:50

## 2023-05-08 RX ADMIN — APIXABAN 2.5 MILLIGRAM(S): 2.5 TABLET, FILM COATED ORAL at 20:19

## 2023-05-08 RX ADMIN — SENNA PLUS 2 TABLET(S): 8.6 TABLET ORAL at 20:18

## 2023-05-08 RX ADMIN — MIRTAZAPINE 7.5 MILLIGRAM(S): 45 TABLET, ORALLY DISINTEGRATING ORAL at 20:18

## 2023-05-08 RX ADMIN — FINASTERIDE 5 MILLIGRAM(S): 5 TABLET, FILM COATED ORAL at 09:50

## 2023-05-08 NOTE — BH INPATIENT PSYCHIATRY PROGRESS NOTE - CURRENT MEDICATION
Problem: Discharge Planning:  Goal: Discharged to appropriate level of care  Description: Discharged to appropriate level of care  Outcome: Completed MEDICATIONS  (STANDING):  apixaban 2.5 milliGRAM(s) Oral every 12 hours  clopidogrel Tablet 75 milliGRAM(s) Oral daily  finasteride 5 milliGRAM(s) Oral daily  melatonin 5 milliGRAM(s) Oral at bedtime  metoprolol succinate ER 25 milliGRAM(s) Oral daily  mirtazapine 7.5 milliGRAM(s) Oral at bedtime  senna 2 Tablet(s) Oral at bedtime  simvastatin 40 milliGRAM(s) Oral at bedtime  tamsulosin 0.4 milliGRAM(s) Oral at bedtime    MEDICATIONS  (PRN):  LORazepam     Tablet 0.5 milliGRAM(s) Oral two times a day PRN Anxiety

## 2023-05-08 NOTE — BH INPATIENT PSYCHIATRY PROGRESS NOTE - NSBHFUPINTERVALHXFT_PSY_A_CORE
Pt still reluctant to accept that he is overwhelmed with caring for himself and wife by himself.  Pt family has hired an aide to help. Pt with better eye contact , remains on the remeron and is able to sleep.

## 2023-05-08 NOTE — BH INPATIENT PSYCHIATRY PROGRESS NOTE - NSBHCHARTREVIEWVS_PSY_A_CORE FT
Vital Signs Last 24 Hrs  T(C): 36.6 (05-08-23 @ 08:12), Max: 36.6 (05-08-23 @ 08:12)  T(F): 97.8 (05-08-23 @ 08:12), Max: 97.8 (05-08-23 @ 08:12)  HR: --  BP: --  BP(mean): --  RR: 18 (05-08-23 @ 08:12) (18 - 18)  SpO2: 99% (05-08-23 @ 08:12) (99% - 99%)    Orthostatic VS  05-08-23 @ 08:12  Lying BP: --/-- HR: --  Sitting BP: 147/79 HR: 64  Standing BP: 133/76 HR: 71  Site: upper right arm  Mode: electronic

## 2023-05-09 PROCEDURE — 99232 SBSQ HOSP IP/OBS MODERATE 35: CPT

## 2023-05-09 RX ORDER — POLYETHYLENE GLYCOL 3350 17 G/17G
17 POWDER, FOR SOLUTION ORAL DAILY
Refills: 0 | Status: DISCONTINUED | OUTPATIENT
Start: 2023-05-09 | End: 2023-05-16

## 2023-05-09 RX ORDER — LANOLIN ALCOHOL/MO/W.PET/CERES
3 CREAM (GRAM) TOPICAL AT BEDTIME
Refills: 0 | Status: DISCONTINUED | OUTPATIENT
Start: 2023-05-09 | End: 2023-05-10

## 2023-05-09 RX ADMIN — SENNA PLUS 2 TABLET(S): 8.6 TABLET ORAL at 21:51

## 2023-05-09 RX ADMIN — CLOPIDOGREL BISULFATE 75 MILLIGRAM(S): 75 TABLET, FILM COATED ORAL at 09:13

## 2023-05-09 RX ADMIN — POLYETHYLENE GLYCOL 3350 17 GRAM(S): 17 POWDER, FOR SOLUTION ORAL at 14:25

## 2023-05-09 RX ADMIN — MIRTAZAPINE 7.5 MILLIGRAM(S): 45 TABLET, ORALLY DISINTEGRATING ORAL at 21:50

## 2023-05-09 RX ADMIN — APIXABAN 2.5 MILLIGRAM(S): 2.5 TABLET, FILM COATED ORAL at 21:50

## 2023-05-09 RX ADMIN — Medication 0.25 MILLIGRAM(S): at 11:30

## 2023-05-09 RX ADMIN — SIMVASTATIN 40 MILLIGRAM(S): 20 TABLET, FILM COATED ORAL at 21:50

## 2023-05-09 RX ADMIN — FINASTERIDE 5 MILLIGRAM(S): 5 TABLET, FILM COATED ORAL at 09:14

## 2023-05-09 RX ADMIN — TAMSULOSIN HYDROCHLORIDE 0.4 MILLIGRAM(S): 0.4 CAPSULE ORAL at 21:51

## 2023-05-09 RX ADMIN — APIXABAN 2.5 MILLIGRAM(S): 2.5 TABLET, FILM COATED ORAL at 09:13

## 2023-05-09 NOTE — BH INPATIENT PSYCHIATRY PROGRESS NOTE - CURRENT MEDICATION
MEDICATIONS  (STANDING):  apixaban 2.5 milliGRAM(s) Oral every 12 hours  clopidogrel Tablet 75 milliGRAM(s) Oral daily  finasteride 5 milliGRAM(s) Oral daily  melatonin 3 milliGRAM(s) Oral at bedtime  metoprolol succinate ER 25 milliGRAM(s) Oral daily  mirtazapine 7.5 milliGRAM(s) Oral at bedtime  polyethylene glycol 3350 17 Gram(s) Oral daily  senna 2 Tablet(s) Oral at bedtime  simvastatin 40 milliGRAM(s) Oral at bedtime  tamsulosin 0.4 milliGRAM(s) Oral at bedtime    MEDICATIONS  (PRN):  LORazepam     Tablet 0.25 milliGRAM(s) Oral two times a day PRN Anxiety

## 2023-05-09 NOTE — BH INPATIENT PSYCHIATRY PROGRESS NOTE - NSBHCHARTREVIEWVS_PSY_A_CORE FT
Vital Signs Last 24 Hrs  T(C): 36.6 (05-09-23 @ 07:50), Max: 36.6 (05-09-23 @ 07:50)  T(F): 97.8 (05-09-23 @ 07:50), Max: 97.8 (05-09-23 @ 07:50)  HR: --  BP: --  BP(mean): --  RR: 18 (05-09-23 @ 07:50) (18 - 18)  SpO2: 99% (05-09-23 @ 07:50) (99% - 99%)    Orthostatic VS  05-09-23 @ 07:50  Lying BP: --/-- HR: --  Sitting BP: 147/72 HR: 62  Standing BP: 153/85 HR: 97  Site: upper right arm  Mode: electronic  Orthostatic VS  05-08-23 @ 08:12  Lying BP: --/-- HR: --  Sitting BP: 147/79 HR: 64  Standing BP: 133/76 HR: 71  Site: upper right arm  Mode: electronic

## 2023-05-09 NOTE — BH INPATIENT PSYCHIATRY PROGRESS NOTE - NSBHFUPINTERVALHXFT_PSY_A_CORE
05/09/2023: Patient was seen today AM, chart reviewed and discussed in team. He is visible in unit, endorses that he feels light headed, and refused to tale Metoprolol Xl 25 mg today AM, VSS are WNL Sitting -BP-144/73; P-84; Standing VSS-BP-125/75; P-106. He was hold off the Metoprolol XL due to dizzy feeling. He receives Ativan 0.5 mg Q-12, was also reduced to 0.25 mg BID for dizziness. CT-head is negative. He endorses that he felt better when he was at [facility], but does  not feel good today and endorses that he is not ready to leave. Tolerating Remeron 7.5 mg HS with good effect.    Pt still reluctant to accept that he is overwhelmed with caring for himself and wife by himself.  Pt family has hired an aide to help. Pt with better eye contact , remains on the remeron and is able to sleep.

## 2023-05-09 NOTE — BH INPATIENT PSYCHIATRY PROGRESS NOTE - NSBHCHARTREVIEWINVESTIGATE_PSY_A_CORE FT
< from: 12 Lead ECG (05.04.23 @ 07:22) >    Ventricular Rate 65 BPM    Atrial Rate 65 BPM    P-R Interval 186 ms    QRS Duration 112 ms    Q-T Interval 432 ms    QTC Calculation(Bazett) 449 ms    P Axis 83 degrees    R Axis -82 degrees    T Axis 82 degrees    Diagnosis Line Normal sinus rhythm  Left axis deviation  Pulmonary disease pattern  Incomplete left bundle branch block  ST & T wave abnormality, consider lateral ischemia  Abnormal ECG  No previous ECGs available  Confirmed by IGGY WEISS, JOSÉ MIGUEL ([id number]) on 5/4/2023 10:07:41    < end of copied text >    
< from: 12 Lead ECG (05.04.23 @ 07:22) >    Ventricular Rate 65 BPM    Atrial Rate 65 BPM    P-R Interval 186 ms    QRS Duration 112 ms    Q-T Interval 432 ms    QTC Calculation(Bazett) 449 ms    P Axis 83 degrees    R Axis -82 degrees    T Axis 82 degrees    Diagnosis Line Normal sinus rhythm  Left axis deviation  Pulmonary disease pattern  Incomplete left bundle branch block  ST & T wave abnormality, consider lateral ischemia  Abnormal ECG  No previous ECGs available  Confirmed by IGGY WEISS, JOSÉ MIGUEL ([id number]) on 5/4/2023 10:07:41 AM    < end of copied text >    
< from: 12 Lead ECG (05.04.23 @ 07:22) >    Ventricular Rate 65 BPM    Atrial Rate 65 BPM    P-R Interval 186 ms    QRS Duration 112 ms    Q-T Interval 432 ms    QTC Calculation(Bazett) 449 ms    P Axis 83 degrees    R Axis -82 degrees    T Axis 82 degrees    Diagnosis Line Normal sinus rhythm  Left axis deviation  Pulmonary disease pattern  Incomplete left bundle branch block  ST & T wave abnormality, consider lateral ischemia  Abnormal ECG  No previous ECGs available  Confirmed by IGGY WEISS, JOSÉ MIGUEL ([id number]) on 5/4/2023 10:07:41    < end of copied text >

## 2023-05-10 DIAGNOSIS — F41.1 GENERALIZED ANXIETY DISORDER: ICD-10-CM

## 2023-05-10 PROCEDURE — 99232 SBSQ HOSP IP/OBS MODERATE 35: CPT

## 2023-05-10 RX ORDER — LANOLIN ALCOHOL/MO/W.PET/CERES
3 CREAM (GRAM) TOPICAL AT BEDTIME
Refills: 0 | Status: DISCONTINUED | OUTPATIENT
Start: 2023-05-10 | End: 2023-05-16

## 2023-05-10 RX ORDER — MIRTAZAPINE 45 MG/1
15 TABLET, ORALLY DISINTEGRATING ORAL AT BEDTIME
Refills: 0 | Status: DISCONTINUED | OUTPATIENT
Start: 2023-05-10 | End: 2023-05-16

## 2023-05-10 RX ADMIN — MIRTAZAPINE 15 MILLIGRAM(S): 45 TABLET, ORALLY DISINTEGRATING ORAL at 22:31

## 2023-05-10 RX ADMIN — Medication 0.25 MILLIGRAM(S): at 08:53

## 2023-05-10 RX ADMIN — Medication 0.25 MILLIGRAM(S): at 17:15

## 2023-05-10 RX ADMIN — TAMSULOSIN HYDROCHLORIDE 0.4 MILLIGRAM(S): 0.4 CAPSULE ORAL at 22:31

## 2023-05-10 RX ADMIN — POLYETHYLENE GLYCOL 3350 17 GRAM(S): 17 POWDER, FOR SOLUTION ORAL at 08:53

## 2023-05-10 RX ADMIN — SIMVASTATIN 40 MILLIGRAM(S): 20 TABLET, FILM COATED ORAL at 22:31

## 2023-05-10 RX ADMIN — Medication 3 MILLIGRAM(S): at 22:27

## 2023-05-10 RX ADMIN — APIXABAN 2.5 MILLIGRAM(S): 2.5 TABLET, FILM COATED ORAL at 08:53

## 2023-05-10 RX ADMIN — FINASTERIDE 5 MILLIGRAM(S): 5 TABLET, FILM COATED ORAL at 08:53

## 2023-05-10 RX ADMIN — Medication 25 MILLIGRAM(S): at 08:53

## 2023-05-10 RX ADMIN — SENNA PLUS 2 TABLET(S): 8.6 TABLET ORAL at 22:31

## 2023-05-10 RX ADMIN — CLOPIDOGREL BISULFATE 75 MILLIGRAM(S): 75 TABLET, FILM COATED ORAL at 08:53

## 2023-05-10 RX ADMIN — APIXABAN 2.5 MILLIGRAM(S): 2.5 TABLET, FILM COATED ORAL at 22:27

## 2023-05-10 NOTE — BH INPATIENT PSYCHIATRY PROGRESS NOTE - CURRENT MEDICATION
MEDICATIONS  (STANDING):  apixaban 2.5 milliGRAM(s) Oral every 12 hours  clopidogrel Tablet 75 milliGRAM(s) Oral daily  finasteride 5 milliGRAM(s) Oral daily  LORazepam     Tablet 0.25 milliGRAM(s) Oral three times a day  melatonin 3 milliGRAM(s) Oral at bedtime  metoprolol succinate ER 25 milliGRAM(s) Oral daily  mirtazapine 15 milliGRAM(s) Oral at bedtime  polyethylene glycol 3350 17 Gram(s) Oral daily  senna 2 Tablet(s) Oral at bedtime  simvastatin 40 milliGRAM(s) Oral at bedtime  tamsulosin 0.4 milliGRAM(s) Oral at bedtime    MEDICATIONS  (PRN):

## 2023-05-10 NOTE — BH INPATIENT PSYCHIATRY PROGRESS NOTE - NSBHFUPINTERVALHXFT_PSY_A_CORE
Pt still with anxiety "even with the thought of having someone new in the house, how does one get used to that?" Pt claiming to be anxious with the changes due to his getting older and not able to care for himself and his wife. Pt not doing well with changes. Pt is still "worried and anxious" . Will change the ativan to standing order so that pt not needing to ask for the medication .  Will also increase the Remeron for sleep and depression .

## 2023-05-10 NOTE — BH INPATIENT PSYCHIATRY PROGRESS NOTE - NSBHCHARTREVIEWVS_PSY_A_CORE FT
Vital Signs Last 24 Hrs  T(C): 36.7 (05-10-23 @ 07:43), Max: 36.7 (05-10-23 @ 07:43)  T(F): 98.1 (05-10-23 @ 07:43), Max: 98.1 (05-10-23 @ 07:43)  HR: --  BP: --  BP(mean): --  RR: 16 (05-10-23 @ 07:43) (16 - 16)  SpO2: 100% (05-10-23 @ 07:43) (100% - 100%)    Orthostatic VS  05-10-23 @ 07:43  Lying BP: --/-- HR: --  Sitting BP: 150/83 HR: 112  Standing BP: 126/72 HR: 120  Site: upper right arm  Mode: electronic  Orthostatic VS  05-09-23 @ 07:50  Lying BP: --/-- HR: --  Sitting BP: 147/72 HR: 62  Standing BP: 153/85 HR: 97  Site: upper right arm  Mode: electronic

## 2023-05-11 PROCEDURE — 99233 SBSQ HOSP IP/OBS HIGH 50: CPT

## 2023-05-11 RX ADMIN — POLYETHYLENE GLYCOL 3350 17 GRAM(S): 17 POWDER, FOR SOLUTION ORAL at 08:44

## 2023-05-11 RX ADMIN — APIXABAN 2.5 MILLIGRAM(S): 2.5 TABLET, FILM COATED ORAL at 08:34

## 2023-05-11 RX ADMIN — CLOPIDOGREL BISULFATE 75 MILLIGRAM(S): 75 TABLET, FILM COATED ORAL at 08:46

## 2023-05-11 RX ADMIN — TAMSULOSIN HYDROCHLORIDE 0.4 MILLIGRAM(S): 0.4 CAPSULE ORAL at 20:11

## 2023-05-11 RX ADMIN — SENNA PLUS 2 TABLET(S): 8.6 TABLET ORAL at 20:11

## 2023-05-11 RX ADMIN — APIXABAN 2.5 MILLIGRAM(S): 2.5 TABLET, FILM COATED ORAL at 20:11

## 2023-05-11 RX ADMIN — Medication 0.25 MILLIGRAM(S): at 08:36

## 2023-05-11 RX ADMIN — Medication 0.25 MILLIGRAM(S): at 17:45

## 2023-05-11 RX ADMIN — MIRTAZAPINE 15 MILLIGRAM(S): 45 TABLET, ORALLY DISINTEGRATING ORAL at 20:11

## 2023-05-11 RX ADMIN — FINASTERIDE 5 MILLIGRAM(S): 5 TABLET, FILM COATED ORAL at 08:34

## 2023-05-11 RX ADMIN — SIMVASTATIN 40 MILLIGRAM(S): 20 TABLET, FILM COATED ORAL at 20:11

## 2023-05-11 NOTE — BH INPATIENT PSYCHIATRY PROGRESS NOTE - NSBHCHARTREVIEWVS_PSY_A_CORE FT
Vital Signs Last 24 Hrs  T(C): 36.7 (05-11-23 @ 07:32), Max: 36.7 (05-11-23 @ 07:32)  T(F): 98 (05-11-23 @ 07:32), Max: 98 (05-11-23 @ 07:32)  HR: --  BP: --  BP(mean): --  RR: 16 (05-11-23 @ 07:32) (16 - 16)  SpO2: 99% (05-11-23 @ 07:32) (99% - 99%)    Orthostatic VS  05-11-23 @ 07:32  Lying BP: --/-- HR: --  Sitting BP: 130/72 HR: 57  Standing BP: 134/76 HR: 66  Site: upper right arm  Mode: electronic  Orthostatic VS  05-10-23 @ 07:43  Lying BP: --/-- HR: --  Sitting BP: 150/83 HR: 112  Standing BP: 126/72 HR: 120  Site: upper right arm  Mode: electronic

## 2023-05-11 NOTE — BH TREATMENT PLAN - NSTXCAREGIVERPARTICIPATE_PSY_P_CORE
Family/Caregiver participated in development of after care plan
Family/Caregiver participated in identification of needs/problems/goals for treatment/Family/Caregiver participated in defining interventions/Family/Caregiver participated in development of after care plan

## 2023-05-11 NOTE — BH TREATMENT PLAN - NSTXCOPEINTERPR_PSY_ALL_CORE
Patient will be encouraged to attend 1-2 psych rehab groups a day to gain insight and work on coping skill development.
Encourage patient to actively participate in 1-2 psych rehab groups daily to improve coping skills.

## 2023-05-11 NOTE — BH TREATMENT PLAN - NSTXPATIENTPARTICIPATE_PSY_ALL_CORE
Patient participated in identification of needs/problems/goals for treatment/Patient participated in defining interventions/Patient participated in development of after care plan/No, patient unwilling to participate
Patient participated in identification of needs/problems/goals for treatment/Patient participated in defining interventions/Patient participated in development of after care plan

## 2023-05-11 NOTE — BH TREATMENT PLAN - NSTXDCOTHRINTERSW_PSY_ALL_CORE
Met today with patient who states he feels no better than when he came in. Expressed a lot of concerns about whether he is on the right medication. Met with pt with Dr. Foy and patient relayed concerns to the drGo stating he feels very anxious, unable to think, unable to function. Dr. BURGESS to review current treatment plan.  Spoke with patient's daughter, Dick Rico, ([phone]). She expressed concern that patient may be overusing rx medications for anxiety. He has been using his PCP for med management, Dr. Short ([phone]) and he just recently had an appt at [facility] in [place]. Will discuss above with treatment team and work with patient toward sticking with one provider and taking meds only as prescribed.
Spoke with patient's son, also Keshav Reyes [phone]. He advised that patient had started to become more depressed and anxious when they had to leave their condo in [place] where they winter early due to wife's worsening symptoms of dementia. Per son patient realized that they would  not be able to use their condo any more and would have to sell it. Since then he's become more and more anxious and overwhelmed.  Per son his stepbrother Harsha [phone] has been having prepared meals delivered to the home and they have an aide who will come 4 days/week all lined up.

## 2023-05-11 NOTE — BH TREATMENT PLAN - NSTXSLPPATGOAL_PSY_ALL_CORE
Will be able to identify and utilize 2 sleep hygiene strategies daily
Will be able to identify and utilize 2 sleep hygiene strategies daily

## 2023-05-11 NOTE — BH INPATIENT PSYCHIATRY PROGRESS NOTE - NSBHFUPINTERVALHXFT_PSY_A_CORE
Pt to continue with the added ativan 0.25mg PO three times a day. Pt is not sedated and is appearing less angst  and with improved goal directed speech. Pt denies any overwhelming depression or anxiety . Pt wiling to continue with the medications and continue with the monitoring  of mood and affect. t denies any suicidal ideation intent or plan   Pt to continue with the added ativan 0.25mg PO three times a day. Pt is not sedated and is appearing less angst  and with improved goal directed speech. Pt denies any overwhelming depression or anxiety . Pt wiling to continue with the medications and continue with the monitoring  of mood and affect. t denies any suicidal ideation intent or plan      Addendum Pt at 6pm cc of ativan at 6pm caused "too tired and dizzy" although pt seen walking around not appearing to be in distress,  Pt askeing that the ativan dose be decreased to two times a day so at Q8am and Q1pm .

## 2023-05-11 NOTE — BH TREATMENT PLAN - NSTXDCOTHRGOAL_PSY_ALL_CORE
Patient will have safe placement upon discharge from  and will have an appointment for continuing in the appropriate level of psychiatric outpatient treatment within 5 business days of d/c from .
Patient will have safe placement upon discharge from  and will have an appointment for continuing in the appropriate level of psychiatric outpatient treatment within 5 business days of d/c from .

## 2023-05-11 NOTE — BH TREATMENT PLAN - NSDCCRITERIA_PSY_ALL_CORE
pt with decreased anxiety and improved sense of hope. 
pt with decreased anxiety and improved sense of hope.

## 2023-05-11 NOTE — BH TREATMENT PLAN - NSCMSPTSTRENGTHS_PSY_ALL_CORE
Expressive of emotions/Financial stability/Intelligence/Interpersonal skills/Supportive family
Intact family/Supportive family

## 2023-05-11 NOTE — BH INPATIENT PSYCHIATRY PROGRESS NOTE - CURRENT MEDICATION
MEDICATIONS  (STANDING):  apixaban 2.5 milliGRAM(s) Oral every 12 hours  clopidogrel Tablet 75 milliGRAM(s) Oral daily  finasteride 5 milliGRAM(s) Oral daily  LORazepam     Tablet 0.25 milliGRAM(s) Oral three times a day  melatonin 3 milliGRAM(s) Oral at bedtime  metoprolol succinate ER 25 milliGRAM(s) Oral daily  mirtazapine 15 milliGRAM(s) Oral at bedtime  polyethylene glycol 3350 17 Gram(s) Oral daily  senna 2 Tablet(s) Oral at bedtime  simvastatin 40 milliGRAM(s) Oral at bedtime  tamsulosin 0.4 milliGRAM(s) Oral at bedtime    MEDICATIONS  (PRN):   MEDICATIONS  (STANDING):  apixaban 2.5 milliGRAM(s) Oral every 12 hours  clopidogrel Tablet 75 milliGRAM(s) Oral daily  finasteride 5 milliGRAM(s) Oral daily  LORazepam     Tablet 0.25 milliGRAM(s) Oral two times a day  melatonin 3 milliGRAM(s) Oral at bedtime  metoprolol succinate ER 25 milliGRAM(s) Oral daily  mirtazapine 15 milliGRAM(s) Oral at bedtime  polyethylene glycol 3350 17 Gram(s) Oral daily  senna 2 Tablet(s) Oral at bedtime  simvastatin 40 milliGRAM(s) Oral at bedtime  tamsulosin 0.4 milliGRAM(s) Oral at bedtime    MEDICATIONS  (PRN):

## 2023-05-11 NOTE — BH TREATMENT PLAN - NSTXPLANTHERAPYSESSIONSFT_PSY_ALL_CORE
05-04-23  Type of therapy: Symptom management  Type of session: Group  Level of patient participation: Attentive,Engaged,Participates  Duration of participation: 45 minutes  Therapy conducted by: Other (specify),[facility]  Therapy Summary: Patient shared that he took too many pills to sleep prior to admission.  Denies suicidal ideation.  Claims he is anxious and just sold his home in [place]. His wife is 94 years old and has dementia. Patient misses her now while he is in the hospital. Claims "my brain isn't right and I don't want to leave here until I am better".    05-04-23  Type of therapy: PHYSICAL THERAPY   Type of session: Individual  Level of patient participation: Attentive,Engaged,Participates  Duration of participation: 30 minutes  Therapy conducted by: Physical Therapist Joyec   Therapy Summary: Pt seen for IE & Rx, including gait training with and wthout RW. Pt exhibits pill-rolling tremor B hands and occasional tardive dyskinetic movement of mouth.Pt transfers IN/OOC with supervision, ambulated 200ft without device but c/o "heavy head",provided RW amb >200ft with steady gait ,moderate fwd head alignment with h/o pinched nerve L C/S January 2023.Pt waiting for dtr to visit and seems very anxious to see her ,left sitting at table in day room ,RW at his side,he will use while here  
  05-08-23  Type of therapy: Self esteem  Type of session: Group  Level of patient participation: Disruptive,Resistance to participation  Duration of participation: 30 minutes  Therapy conducted by: Psych rehab  Therapy Summary: Patient attended half of the Morning Gratitude and Goal Planning Group. Patient struggled to focus on the discussion and directive, such as interupting during the opening meditation to ask for water and in response to the writer's prompt on gratitude asked, "Why does my head feel heavy when I wake up?"     Patient also struggled to focus on the group topics and instead was focused on the temperature in the day room, which the patient brought up to the writer the day before and the writer educated the patient on the dynamics of the environmental controls for the unit.    Patient eventually got up from group 30 minutes into the group time to request an anxiety pill from the nurse's station and did not return to the group.    05-08-23  Type of therapy: Physical Therapy   Type of session: Individual  Level of patient participation: Attentive  Duration of participation: 15 minutes  Therapy conducted by: Physical Therapist Joyce   Therapy Summary: Pt rec'd sitting in day room with slumped seated posture, stands out of chair with sueprvision at RW ,ambulated in hallway with RW with slow,steady gait, moderate fwd head alignment ,sloped shoulders .Stand to sit on bed with supervision, sits unsupported at EOB >10 mins talking with PT while performing AROM exs BUE/BLEs , pt c/o feeling tired and hoped he would be able to take a nap , c/o twitching heavy eyelids , Assumes R sidelying in bed in position of comfort , seems less anxious than on initial encounter ,doing well with RW unassisted .Will progress to cane if safe    05-10-23  Type of therapy: Psychoeducation  Type of session: Group  Level of patient participation: Disruptive,Resistance to participation,Irritble and hostile.  Duration of participation: 45 minutes  Therapy conducted by: Other (erendira),[facility]  Therapy Summary: Patient irritable about being in group therapy.  Asking to switch groups and hostole towards the therapist.  "I don't want to speak about the truth today.  I have alot going on."  Patient never asked about truth or any other topic.  He was disruptive and somewhat agitated trying to go in and out of group rooms several times.  Appeared confused and not open to feedback. Left group and went back to his room. Appears depressed.    05-11-23  Type of therapy: Symptom management  Type of session: Group  Level of patient participation: Engaged,Participates  Duration of participation: 60 minutes  Therapy conducted by: Psych rehab  Therapy Summary: Patient attended group therapy. Happy to report he slept well the previous evening. Shared that his family has been working to set up an in home aide for his wife's care. Focusing on the fact that this aide will need to use the second bedroom and he will have to relocate his memorabilia. Anxious and ruminating about this. Having trouble accepting change. Education and support ongoing.

## 2023-05-12 PROCEDURE — 99233 SBSQ HOSP IP/OBS HIGH 50: CPT

## 2023-05-12 RX ADMIN — FINASTERIDE 5 MILLIGRAM(S): 5 TABLET, FILM COATED ORAL at 08:23

## 2023-05-12 RX ADMIN — MIRTAZAPINE 15 MILLIGRAM(S): 45 TABLET, ORALLY DISINTEGRATING ORAL at 20:23

## 2023-05-12 RX ADMIN — Medication 0.25 MILLIGRAM(S): at 13:00

## 2023-05-12 RX ADMIN — Medication 3 MILLIGRAM(S): at 20:23

## 2023-05-12 RX ADMIN — APIXABAN 2.5 MILLIGRAM(S): 2.5 TABLET, FILM COATED ORAL at 20:23

## 2023-05-12 RX ADMIN — Medication 0.25 MILLIGRAM(S): at 08:23

## 2023-05-12 RX ADMIN — CLOPIDOGREL BISULFATE 75 MILLIGRAM(S): 75 TABLET, FILM COATED ORAL at 08:23

## 2023-05-12 RX ADMIN — SIMVASTATIN 40 MILLIGRAM(S): 20 TABLET, FILM COATED ORAL at 20:22

## 2023-05-12 RX ADMIN — TAMSULOSIN HYDROCHLORIDE 0.4 MILLIGRAM(S): 0.4 CAPSULE ORAL at 20:23

## 2023-05-12 RX ADMIN — APIXABAN 2.5 MILLIGRAM(S): 2.5 TABLET, FILM COATED ORAL at 08:23

## 2023-05-12 RX ADMIN — POLYETHYLENE GLYCOL 3350 17 GRAM(S): 17 POWDER, FOR SOLUTION ORAL at 08:35

## 2023-05-12 RX ADMIN — SENNA PLUS 2 TABLET(S): 8.6 TABLET ORAL at 20:23

## 2023-05-12 NOTE — BH INPATIENT PSYCHIATRY PROGRESS NOTE - NSBHFUPINTERVALHXFT_PSY_A_CORE
Pt reacting to talk of him having a live in aide . Pt with usual attempt to avoid acknowledging that he is getting older and needing help in his own care and care of wife.   Spoke with the daughter who is willing to have the live in aide .  Pt needing to have adjustment to changes in his home.

## 2023-05-12 NOTE — BH INPATIENT PSYCHIATRY PROGRESS NOTE - NSBHCHARTREVIEWVS_PSY_A_CORE FT
Vital Signs Last 24 Hrs  T(C): 36.7 (05-12-23 @ 07:50), Max: 36.7 (05-12-23 @ 07:50)  T(F): 98.1 (05-12-23 @ 07:50), Max: 98.1 (05-12-23 @ 07:50)  HR: --  BP: --  BP(mean): --  RR: 16 (05-12-23 @ 07:50) (16 - 16)  SpO2: 100% (05-12-23 @ 07:50) (100% - 100%)    Orthostatic VS  05-12-23 @ 07:50  Lying BP: --/-- HR: --  Sitting BP: 164/96 HR: 85  Standing BP: 156/87 HR: 94  Site: upper right arm  Mode: electronic  Orthostatic VS  05-11-23 @ 07:32  Lying BP: --/-- HR: --  Sitting BP: 130/72 HR: 57  Standing BP: 134/76 HR: 66  Site: upper right arm  Mode: electronic

## 2023-05-12 NOTE — BH INPATIENT PSYCHIATRY PROGRESS NOTE - CURRENT MEDICATION
MEDICATIONS  (STANDING):  apixaban 2.5 milliGRAM(s) Oral every 12 hours  clopidogrel Tablet 75 milliGRAM(s) Oral daily  finasteride 5 milliGRAM(s) Oral daily  LORazepam     Tablet 0.25 milliGRAM(s) Oral <User Schedule>  melatonin 3 milliGRAM(s) Oral at bedtime  metoprolol succinate ER 25 milliGRAM(s) Oral daily  mirtazapine 15 milliGRAM(s) Oral at bedtime  polyethylene glycol 3350 17 Gram(s) Oral daily  senna 2 Tablet(s) Oral at bedtime  simvastatin 40 milliGRAM(s) Oral at bedtime  tamsulosin 0.4 milliGRAM(s) Oral at bedtime    MEDICATIONS  (PRN):

## 2023-05-13 RX ADMIN — FINASTERIDE 5 MILLIGRAM(S): 5 TABLET, FILM COATED ORAL at 09:31

## 2023-05-13 RX ADMIN — APIXABAN 2.5 MILLIGRAM(S): 2.5 TABLET, FILM COATED ORAL at 20:26

## 2023-05-13 RX ADMIN — Medication 0.25 MILLIGRAM(S): at 12:13

## 2023-05-13 RX ADMIN — CLOPIDOGREL BISULFATE 75 MILLIGRAM(S): 75 TABLET, FILM COATED ORAL at 09:31

## 2023-05-13 RX ADMIN — Medication 0.25 MILLIGRAM(S): at 09:33

## 2023-05-13 RX ADMIN — TAMSULOSIN HYDROCHLORIDE 0.4 MILLIGRAM(S): 0.4 CAPSULE ORAL at 20:26

## 2023-05-13 RX ADMIN — MIRTAZAPINE 15 MILLIGRAM(S): 45 TABLET, ORALLY DISINTEGRATING ORAL at 20:26

## 2023-05-13 RX ADMIN — APIXABAN 2.5 MILLIGRAM(S): 2.5 TABLET, FILM COATED ORAL at 09:31

## 2023-05-13 RX ADMIN — SIMVASTATIN 40 MILLIGRAM(S): 20 TABLET, FILM COATED ORAL at 20:27

## 2023-05-14 RX ADMIN — SIMVASTATIN 40 MILLIGRAM(S): 20 TABLET, FILM COATED ORAL at 20:49

## 2023-05-14 RX ADMIN — TAMSULOSIN HYDROCHLORIDE 0.4 MILLIGRAM(S): 0.4 CAPSULE ORAL at 20:48

## 2023-05-14 RX ADMIN — Medication 0.25 MILLIGRAM(S): at 12:35

## 2023-05-14 RX ADMIN — SENNA PLUS 2 TABLET(S): 8.6 TABLET ORAL at 20:48

## 2023-05-14 RX ADMIN — FINASTERIDE 5 MILLIGRAM(S): 5 TABLET, FILM COATED ORAL at 08:55

## 2023-05-14 RX ADMIN — MIRTAZAPINE 15 MILLIGRAM(S): 45 TABLET, ORALLY DISINTEGRATING ORAL at 20:48

## 2023-05-14 RX ADMIN — Medication 0.25 MILLIGRAM(S): at 09:00

## 2023-05-14 RX ADMIN — CLOPIDOGREL BISULFATE 75 MILLIGRAM(S): 75 TABLET, FILM COATED ORAL at 09:01

## 2023-05-14 RX ADMIN — APIXABAN 2.5 MILLIGRAM(S): 2.5 TABLET, FILM COATED ORAL at 20:48

## 2023-05-14 RX ADMIN — APIXABAN 2.5 MILLIGRAM(S): 2.5 TABLET, FILM COATED ORAL at 08:56

## 2023-05-15 PROCEDURE — 99232 SBSQ HOSP IP/OBS MODERATE 35: CPT

## 2023-05-15 RX ADMIN — Medication 0.25 MILLIGRAM(S): at 08:42

## 2023-05-15 RX ADMIN — Medication 3 MILLIGRAM(S): at 20:11

## 2023-05-15 RX ADMIN — SENNA PLUS 2 TABLET(S): 8.6 TABLET ORAL at 20:10

## 2023-05-15 RX ADMIN — TAMSULOSIN HYDROCHLORIDE 0.4 MILLIGRAM(S): 0.4 CAPSULE ORAL at 20:10

## 2023-05-15 RX ADMIN — Medication 25 MILLIGRAM(S): at 08:52

## 2023-05-15 RX ADMIN — SIMVASTATIN 40 MILLIGRAM(S): 20 TABLET, FILM COATED ORAL at 20:11

## 2023-05-15 RX ADMIN — Medication 0.25 MILLIGRAM(S): at 12:40

## 2023-05-15 RX ADMIN — APIXABAN 2.5 MILLIGRAM(S): 2.5 TABLET, FILM COATED ORAL at 08:44

## 2023-05-15 RX ADMIN — FINASTERIDE 5 MILLIGRAM(S): 5 TABLET, FILM COATED ORAL at 08:43

## 2023-05-15 RX ADMIN — APIXABAN 2.5 MILLIGRAM(S): 2.5 TABLET, FILM COATED ORAL at 20:11

## 2023-05-15 RX ADMIN — CLOPIDOGREL BISULFATE 75 MILLIGRAM(S): 75 TABLET, FILM COATED ORAL at 08:46

## 2023-05-15 RX ADMIN — MIRTAZAPINE 15 MILLIGRAM(S): 45 TABLET, ORALLY DISINTEGRATING ORAL at 20:11

## 2023-05-15 NOTE — BH DISCHARGE NOTE NURSING/SOCIAL WORK/PSYCH REHAB - NSDPDISTO_PSY_ALL_CORE
[address], [address]  [place] [address]  [phone]    Patient will have live-in aide arranged by his family./Home

## 2023-05-15 NOTE — BH DISCHARGE NOTE NURSING/SOCIAL WORK/PSYCH REHAB - PATIENT PORTAL LINK FT
You can access the FollowMyHealth Patient Portal offered by [facility] by registering at the following website: [email or web address]. By joining [facility]’s FollowMyHealth portal, you will also be able to view your health information using other applications (apps) compatible with our system.

## 2023-05-15 NOTE — BH INPATIENT PSYCHIATRY PROGRESS NOTE - NSBHMETABOLIC_PSY_ALL_CORE_FT
BMI: BMI (kg/m2): 22.1 (05-03-23 @ 15:05)  HbA1c: A1C with Estimated Average Glucose Result: 5.6 % (05-04-23 @ 08:33)    Glucose:   BP: --  Lipid Panel: Date/Time: 05-04-23 @ 08:33  Cholesterol, Serum: 139  Direct LDL: --  HDL Cholesterol, Serum: 52  Total Cholesterol/HDL Ration Measurement: --  Triglycerides, Serum: 77   BMI:   HbA1c:   Glucose:   BP: --  Lipid Panel:

## 2023-05-15 NOTE — BH DISCHARGE NOTE NURSING/SOCIAL WORK/PSYCH REHAB - NSCDUDCCRISIS_PSY_A_CORE
.National Suicide Prevention Lifeline 1 (223) 506-7567/.  [employer]  1 (760) LIFENET (787-0739)/.  [facility]  [phone] 118-7716/.  [facility] Behavioral Health Helpline / [facility]  [phone] 996-PGNI (1845)/.  [facility]  [address] [place] [address] [phone]   Hours:  Monday through Friday from 9 AM to 3 PM/Other.../988 Suicide and Crisis Lifeline .National Suicide Prevention Lifeline 0 (116) 199-8531/.  [facility]  1 (424) LIFENET (907-4214)/.  [facility]  [phone] 622-3326/.  [facility] Behavioral Health Helpline / [facility]  [phone] 745-NGSQ (6596)/.  [facility]  [address] [place], [place] [address] [phone]   Hours:  Monday through Friday from 9 AM to 3 PM/.  [facility] Crisis Line  7 [phone], Option 1/Other.../988 Suicide and Crisis Lifeline

## 2023-05-15 NOTE — BH DISCHARGE NOTE NURSING/SOCIAL WORK/PSYCH REHAB - NSDCADDINFO1FT_PSY_ALL_CORE
Patient will continue in Mental Health treatment with his previous provider,  primary care physician, Dr. Short, (Patient declined psychiatric referral.) Patient has an appointment to see Dr. Short on Wednesday, 5/17/2023 at 1:45PM

## 2023-05-15 NOTE — BH INPATIENT PSYCHIATRY PROGRESS NOTE - CURRENT MEDICATION
MEDICATIONS  (STANDING):  apixaban 2.5 milliGRAM(s) Oral every 12 hours  clopidogrel Tablet 75 milliGRAM(s) Oral daily  finasteride 5 milliGRAM(s) Oral daily  LORazepam     Tablet 0.25 milliGRAM(s) Oral <User Schedule>  melatonin 3 milliGRAM(s) Oral at bedtime  metoprolol succinate ER 25 milliGRAM(s) Oral daily  mirtazapine 15 milliGRAM(s) Oral at bedtime  polyethylene glycol 3350 17 Gram(s) Oral daily  senna 2 Tablet(s) Oral at bedtime  simvastatin 40 milliGRAM(s) Oral at bedtime  tamsulosin 0.4 milliGRAM(s) Oral at bedtime    MEDICATIONS  (PRN):   MEDICATIONS  (STANDING):    MEDICATIONS  (PRN):

## 2023-05-15 NOTE — BH INPATIENT PSYCHIATRY PROGRESS NOTE - NSBHFUPINTERVALHXFT_PSY_A_CORE
Pt still asking for an anticoagulant as he is believing" I have thick blood and that is causing the anxiety  attacks so I need to do some blood letting or anticoagulant. 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  Pt with decreased anxiety of returning home but is now still attributing unrealistic effects to medication usually concerning ativan and melatonin .  Pt with need for medication to be given on a set schedule so that it is not changed often according to what the pt is believing at the moment. Pt with mild to moderate cognitive changes and will be need ing the assist of his aide for medications

## 2023-05-15 NOTE — BH INPATIENT PSYCHIATRY PROGRESS NOTE - NSBHCHARTREVIEWVS_PSY_A_CORE FT
Vital Signs Last 24 Hrs  T(C): 36.6 (05-15-23 @ 08:40), Max: 36.6 (05-15-23 @ 08:40)  T(F): 97.8 (05-15-23 @ 08:40), Max: 97.8 (05-15-23 @ 08:40)  HR: --  BP: --  BP(mean): --  RR: 18 (05-15-23 @ 08:40) (18 - 18)  SpO2: 100% (05-15-23 @ 08:40) (100% - 100%)    Orthostatic VS  05-15-23 @ 08:40  Lying BP: --/-- HR: --  Sitting BP: 150/84 HR: 97  Standing BP: 138/83 HR: 103  Site: --  Mode: electronic  Orthostatic VS  05-14-23 @ 08:02  Lying BP: --/-- HR: --  Sitting BP: 154/87 HR: --  Standing BP: 157/106 HR: --  Site: upper left arm  Mode: electronic   Vital Signs Last 24 Hrs  T(C): --  T(F): --  HR: --  BP: --  BP(mean): --  RR: --  SpO2: --

## 2023-05-16 VITALS — RESPIRATION RATE: 16 BRPM | TEMPERATURE: 98 F | OXYGEN SATURATION: 100 %

## 2023-05-16 PROCEDURE — 99239 HOSP IP/OBS DSCHRG MGMT >30: CPT

## 2023-05-16 RX ORDER — MIRTAZAPINE 45 MG/1
1 TABLET, ORALLY DISINTEGRATING ORAL
Qty: 15 | Refills: 1
Start: 2023-05-16 | End: 2023-06-14

## 2023-05-16 RX ORDER — TAMSULOSIN HYDROCHLORIDE 0.4 MG/1
1 CAPSULE ORAL
Qty: 15 | Refills: 1
Start: 2023-05-16 | End: 2023-06-14

## 2023-05-16 RX ORDER — METOPROLOL TARTRATE 50 MG
1 TABLET ORAL
Qty: 15 | Refills: 1
Start: 2023-05-16 | End: 2023-06-14

## 2023-05-16 RX ORDER — CLOPIDOGREL BISULFATE 75 MG/1
1 TABLET, FILM COATED ORAL
Qty: 15 | Refills: 1
Start: 2023-05-16 | End: 2023-06-14

## 2023-05-16 RX ORDER — SIMVASTATIN 20 MG/1
1 TABLET, FILM COATED ORAL
Qty: 15 | Refills: 1
Start: 2023-05-16 | End: 2023-06-14

## 2023-05-16 RX ORDER — LANOLIN ALCOHOL/MO/W.PET/CERES
1 CREAM (GRAM) TOPICAL
Qty: 15 | Refills: 1
Start: 2023-05-16 | End: 2023-06-14

## 2023-05-16 RX ORDER — SENNA PLUS 8.6 MG/1
2 TABLET ORAL
Qty: 30 | Refills: 1
Start: 2023-05-16 | End: 2023-06-14

## 2023-05-16 RX ORDER — POLYETHYLENE GLYCOL 3350 17 G/17G
17 POWDER, FOR SOLUTION ORAL
Qty: 255 | Refills: 1
Start: 2023-05-16 | End: 2023-06-14

## 2023-05-16 RX ORDER — APIXABAN 2.5 MG/1
1 TABLET, FILM COATED ORAL
Qty: 30 | Refills: 0
Start: 2023-05-16 | End: 2023-05-30

## 2023-05-16 RX ORDER — FINASTERIDE 5 MG/1
1 TABLET, FILM COATED ORAL
Qty: 15 | Refills: 0
Start: 2023-05-16 | End: 2023-05-30

## 2023-05-16 RX ADMIN — APIXABAN 2.5 MILLIGRAM(S): 2.5 TABLET, FILM COATED ORAL at 08:27

## 2023-05-16 RX ADMIN — CLOPIDOGREL BISULFATE 75 MILLIGRAM(S): 75 TABLET, FILM COATED ORAL at 08:27

## 2023-05-16 RX ADMIN — Medication 0.25 MILLIGRAM(S): at 08:26

## 2023-05-16 RX ADMIN — Medication 0.25 MILLIGRAM(S): at 12:31

## 2023-05-16 RX ADMIN — FINASTERIDE 5 MILLIGRAM(S): 5 TABLET, FILM COATED ORAL at 08:29

## 2023-05-16 NOTE — BH INPATIENT PSYCHIATRY PROGRESS NOTE - NSBHCHARTREVIEWVS_PSY_A_CORE FT
Vital Signs Last 24 Hrs  T(C): 36.7 (05-16-23 @ 07:12), Max: 36.7 (05-16-23 @ 07:12)  T(F): 98.1 (05-16-23 @ 07:12), Max: 98.1 (05-16-23 @ 07:12)  HR: --  BP: --  BP(mean): --  RR: 16 (05-16-23 @ 07:12) (16 - 16)  SpO2: 100% (05-16-23 @ 07:12) (100% - 100%)    Orthostatic VS  05-16-23 @ 07:12  Lying BP: --/-- HR: --  Sitting BP: 156/77 HR: 70  Standing BP: 134/100 HR: 87  Site: upper right arm  Mode: electronic  Orthostatic VS  05-15-23 @ 08:40  Lying BP: --/-- HR: --  Sitting BP: 150/84 HR: 97  Standing BP: 138/83 HR: 103  Site: --  Mode: electronic

## 2023-05-16 NOTE — BH INPATIENT PSYCHIATRY PROGRESS NOTE - NSTXCOPEDATEEST_PSY_ALL_CORE
10-May-2023
03-May-2023
10-May-2023
10-May-2023
03-May-2023
03-May-2023
10-May-2023
03-May-2023
10-May-2023

## 2023-05-16 NOTE — BH INPATIENT PSYCHIATRY PROGRESS NOTE - NSTXDEPRESDATEEST_PSY_ALL_CORE
03-May-2023
15-May-2023
03-May-2023

## 2023-05-16 NOTE — BH INPATIENT PSYCHIATRY PROGRESS NOTE - NSTXANXGOAL_PSY_ALL_CORE
Be able to participate in activities despite lingering anxiety/panic
How Severe Are They?: moderate
Is This A New Presentation, Or A Follow-Up?: Skin Lesions
Be able to participate in activities despite lingering anxiety/panic
no

## 2023-05-16 NOTE — BH INPATIENT PSYCHIATRY PROGRESS NOTE - NSBHMSETHTCONTENT_PSY_A_CORE
Preoccupations/Suicidality

## 2023-05-16 NOTE — BH INPATIENT PSYCHIATRY PROGRESS NOTE - NSBHFUPINTERVALHXFT_PSY_A_CORE
Pt is aware of discharge today.  Pt with euthymic mood and affect is full and mood congruent.  Pt with goal directed speech He denies any hallucination .  Pt denies any side effects from medications. Pt willing to go home and verbalized willing to attend aftercare treatment

## 2023-05-16 NOTE — BH INPATIENT PSYCHIATRY PROGRESS NOTE - NSICDXBHSECONDARYDX_PSY_ALL_CORE
AXEL (generalized anxiety disorder)   F41.1  

## 2023-05-16 NOTE — BH INPATIENT PSYCHIATRY PROGRESS NOTE - NSTXANXINTERMD_PSY_ALL_CORE
pt started on zoloft
pt continues with Abilify for mood and affect stability 
pt continues with Abilify for mood and affect stability 
pt started on zoloft
pt started on zoloft

## 2023-05-16 NOTE — BH INPATIENT PSYCHIATRY PROGRESS NOTE - NSTXDCOTHRDATETRGT_PSY_ALL_CORE
11-May-2023

## 2023-05-16 NOTE — BH INPATIENT PSYCHIATRY DISCHARGE NOTE - NSBHDCMEDICALFT_PSY_A_CORE
hx of CV stroke x3, atrial fib, colon cancer, CAD, esophageal stenosis reflux esophagitis, thoracic ascending aortic aneurysm 4.8cm, TIA with right arm weakness 2013

## 2023-05-16 NOTE — BH INPATIENT PSYCHIATRY DISCHARGE NOTE - REASON FOR ADMISSION
89 yr old  retired domiciled  male BIB his family to [facility] ED  with the cc of decreased daily function ie of cooking , cleaning, caring for himself and his wife  ,having  increased anxiety, worrying,  and depression pt reported to  the ED RN that "he would kill himself if he is discharged. Pt with hx of CV stroke x3, atrial fib, colon cancer, CAD, esophageal stenosis reflux esophagitis, thoracic ascending aortic aneurysm 4.8cm, TIA with right arm weakness 2013aller: Jenny. Pt with hx of suicide attempt 15 yrs ago by OD on medication and was hospitalized at [facility]

## 2023-05-16 NOTE — BH INPATIENT PSYCHIATRY PROGRESS NOTE - NSTXSLPPATGOAL_PSY_ALL_CORE
Will be able to identify and utilize 2 sleep hygiene strategies daily

## 2023-05-16 NOTE — BH INPATIENT PSYCHIATRY PROGRESS NOTE - NSBHATTESTBILLING_PSY_A_CORE
Billing in another system
59512-Etsthivqkg OBS or IP - moderate complexity OR 35-49 mins
Billing in another system
Billing in another system
14283-Recttqfhfy OBS or IP - moderate complexity OR 35-49 mins
39498-Csmiwvpohe OBS or IP - moderate complexity OR 35-49 mins

## 2023-05-16 NOTE — BH INPATIENT PSYCHIATRY PROGRESS NOTE - NSBHMSEKNOWHOW_PSY_ALL_CORE
Current Events

## 2023-05-16 NOTE — BH INPATIENT PSYCHIATRY DISCHARGE NOTE - OTHER PAST PSYCHIATRIC HISTORY (INCLUDE DETAILS REGARDING ONSET, COURSE OF ILLNESS, INPATIENT/OUTPATIENT TREATMENT)
Patient was admitted to  on transfer from [facility] where he presented for evaluation of depression with suicidal thoughts. Patient is a 89 year old, , white male who stated in ED "I will kill myself if I go home." Patient is struggling with decreased ability to function on a daily basis with cooking, cleaning and shopping for himself and his wife due to his age, with the complicating and severe stressor of having to care for his wife who is struggling with dementia.  Patient with his own significant medical problems as well. Patient does not see possibility of his circumstances improving, leaving him with feelings of hopelessness, helplessness and wanting to die.

## 2023-05-16 NOTE — BH INPATIENT PSYCHIATRY PROGRESS NOTE - NSDCCRITERIA_PSY_ALL_CORE
pt with decreased anxiety and improved sense of hope. 

## 2023-05-16 NOTE — BH INPATIENT PSYCHIATRY DISCHARGE NOTE - HOSPITAL COURSE
Pt with gradual decreased anxiety and depression with Remeron.  Pt with improved appetite and sleep.  Pt denies any suicidal ideation or plan . Pt denies any side effects from medication and is with decreased stress.  Pt will be returning home and will have a live in aide which the family has obtained to care for the pt and his elderly wife.

## 2023-05-16 NOTE — BH INPATIENT PSYCHIATRY PROGRESS NOTE - NSTXSLPPATDATEEST_PSY_ALL_CORE
03-May-2023

## 2023-05-16 NOTE — BH INPATIENT PSYCHIATRY PROGRESS NOTE - NSTXDCOTHRGOAL_PSY_ALL_CORE
Patient will have safe placement upon discharge from  and will have an appointment for continuing in the appropriate level of psychiatric outpatient treatment within 5 business days of d/c from .

## 2023-05-16 NOTE — BH INPATIENT PSYCHIATRY PROGRESS NOTE - NSTXDEPRESDATETRGT_PSY_ALL_CORE
10-May-2023
22-May-2023
10-May-2023

## 2023-05-16 NOTE — BH INPATIENT PSYCHIATRY PROGRESS NOTE - NSTXCOPEDATETRGT_PSY_ALL_CORE
17-May-2023
17-May-2023
10-May-2023
17-May-2023
10-May-2023
17-May-2023
10-May-2023
10-May-2023
17-May-2023
10-May-2023
10-May-2023

## 2023-05-16 NOTE — BH INPATIENT PSYCHIATRY DISCHARGE NOTE - NSDCMRMEDTOKEN_GEN_ALL_CORE_FT
apixaban 2.5 mg oral tablet: 1 tab(s) orally every 12 hours  clopidogrel 75 mg oral tablet: 1 tab(s) orally once a day MDD: 75mg  finasteride 5 mg oral tablet: 1 tab(s) orally once a day  melatonin 3 mg oral tablet: 1 tab(s) orally once a day (at bedtime)  metoprolol succinate 25 mg oral tablet, extended release: 1 tab(s) orally once a day  mirtazapine 15 mg oral tablet: 1 tab(s) orally once a day (at bedtime)  polyethylene glycol 3350 oral powder for reconstitution: 17 gram(s) orally once a day  senna leaf extract oral tablet: 2 tab(s) orally once a day (at bedtime)  simvastatin 40 mg oral tablet: 1 tab(s) orally once a day (at bedtime)  tamsulosin 0.4 mg oral capsule: 1 cap(s) orally once a day (at bedtime)

## 2023-05-16 NOTE — BH INPATIENT PSYCHIATRY PROGRESS NOTE - NSBHTIMEACTIVITIESPERFORMED_PSY_A_CORE
1. interviewed the pt to assess current mental status  2. reviewed medications with the pt    3. reviewed lab results   4. conferred with nursing concerning the pt level of function on the unit   5. conferred with  social work concerning aftercare planning
1. interviewed the pt to assess current mental status  2. reviewed medications continue with zoloft    3. reviewed lab results   4. dietary offered dietary changes   5. conferred with  social work 
1. interviewed the pt to assess current mental status  2. reviewed medications pt not sedated   3. reviewed lab results   4. spoke with  daughter who is willing to take pt home and provide live in aide   5. conferred with  nursing and social work concerning pt level of fxn
1. interviewed the pt to assess current mental status  2. reviewed medications    3. reviewed lab results   4. conferred with nursing concerning over all behavior on the unit    5. conferred with  social work on aftercare planning 
1. interviewed the pt to assess current mental status  2.discharge summary  3. reviewed lab results   4.conferred with nursing and social work aftercare plan

## 2023-05-16 NOTE — BH INPATIENT PSYCHIATRY PROGRESS NOTE - NSTXDEPRESGOAL_PSY_ALL_CORE
Report using a coping skill to overcome sadness and worry in order to socialize with peers daily

## 2023-05-16 NOTE — BH INPATIENT PSYCHIATRY DISCHARGE NOTE - HPI (INCLUDE ILLNESS QUALITY, SEVERITY, DURATION, TIMING, CONTEXT, MODIFYING FACTORS, ASSOCIATED SIGNS AND SYMPTOMS)
89 yr old  retired domiciled  male who was reportedly BIB his family to [facility] ED  with the cc of decreased daily function ie of cooking , cleaning, caring for himself and his wife  ,having  increased anxiety, worrying,  and depression. ,Pt was being psych cleared to return home, but the pt reported to  the ED RN that "he would kill himself if he is discharged." The daughter reportedly said to the ED staff that "pt had a suicide attempt 15 yrs ago by OD on medication and was hospitalized at [facility]. Pt found to be oriented to self, place , knows who the president of the [place] , denies any hallucinations, no delusions elicited. Pt endorses having suicidal ideation as he cannot change the stressors in his life such as his wife with dementia, his declining daily function, decreased independence and not having much help to function on a daily basis.   Pt with hx of CV stroke x3, atrial fib, colon cancer, CAD, esophageal stenosis reflux esophagitis, thoracic ascending aortic aneurysm 4.8cm, TIA with right arm weakness 2013aller: Jenny.

## 2023-05-16 NOTE — BH INPATIENT PSYCHIATRY PROGRESS NOTE - NSTXSLPPATINTERMD_PSY_ALL_CORE
trazodone for insomnia

## 2023-05-16 NOTE — BH INPATIENT PSYCHIATRY PROGRESS NOTE - NSICDXBHPRIMARYDX_PSY_ALL_CORE
Adjustment disorder   F43.20  
Major depression   F32.9  
Major depression   F32.9  
Adjustment disorder   F43.20  
Major depression   F32.9  
Major depression   F32.9  
Adjustment disorder   F43.20  
Major depression   F32.9  
Adjustment disorder   F43.20  

## 2023-05-16 NOTE — BH INPATIENT PSYCHIATRY PROGRESS NOTE - PRN MEDS
MEDICATIONS  (PRN):  hydrOXYzine hydrochloride 25 milliGRAM(s) Oral three times a day PRN anxiety  LORazepam     Tablet 0.5 milliGRAM(s) Oral two times a day PRN anxietyanxiety  
MEDICATIONS  (PRN):  
MEDICATIONS  (PRN):  LORazepam     Tablet 0.5 milliGRAM(s) Oral two times a day PRN Anxiety  
MEDICATIONS  (PRN):  LORazepam     Tablet 0.25 milliGRAM(s) Oral two times a day PRN Anxiety  
MEDICATIONS  (PRN):  
MEDICATIONS  (PRN):  
MEDICATIONS  (PRN):  LORazepam     Tablet 0.5 milliGRAM(s) Oral two times a day PRN Anxiety  
MEDICATIONS  (PRN):  LORazepam     Tablet 0.5 milliGRAM(s) Oral two times a day PRN Anxiety  
MEDICATIONS  (PRN):  hydrOXYzine hydrochloride 25 milliGRAM(s) Oral three times a day PRN anxiety  
MEDICATIONS  (PRN):  
MEDICATIONS  (PRN):

## 2023-05-16 NOTE — BH INPATIENT PSYCHIATRY PROGRESS NOTE - NSTXCOPEPROGRES_PSY_ALL_CORE
No Change
Improving
No Change
Improving
No Change
Improving

## 2023-05-16 NOTE — BH INPATIENT PSYCHIATRY PROGRESS NOTE - NSTXDCOTHRDATEEST_PSY_ALL_CORE
04-May-2023

## 2023-05-16 NOTE — BH INPATIENT PSYCHIATRY PROGRESS NOTE - NSBHASSESSSUMMFT_PSY_ALL_CORE
acute : Low risk, pt fearful of pin of death  mitigating: denies any intent or plan   protective : has a wife, place to live, support of children   chronic , noncompliance with treatment 
Assessment Summary  acute : Low risk, pt fearful of pain of death, live in aid ,worrying about having a stranger in the home.   mitigating: denies any intent or plan   protective : has a wife, place to live, support of children , will have live in aide  chronic , noncompliance with treatment  pt with decreased anxiety and improved sense of hope.  
acute :moderate  with depressed and anxious mood,   mitigating: pt denies any intent while in the hospital   protective: cares for wife with dementia , has adult children, has place of residence  chronic: prior psych hospitalization , Hx of SA, wife with dementia, continued eroding of physical capacity to care for self and wife.   
acute : Low risk, pt fearful of pain of dying,Pt with improved mood and affect but continues with somatic delusions   mitigating: denies any intent or plan   protective : has a wife, place to live, support of children, family claims to have obtained a live in aide for the pt and his wife.    chronic , noncompliance with treatment  
acute : Low risk, pt fearful of pain of death, live in aid ,worrying about having a stranger in the home.   mitigating: denies any intent or plan   protective : has a wife, place to live, support of children   chronic , noncompliance with treatment  pt with decreased anxiety and improved sense of hope.  
switching to remeron at 7.5mg   ativan 0,5mg po BID prn for anxiety 
acute : Low risk, pt fearful of pain of dying, Pt with improved mood and affect , Pt accepting that there is aide in the home.   mitigating: denies any intent or plan , pt verbalized willing to attend aftercare treatment.   protective : has a wife, place to live, support of children, family claims to have obtained a live in aide for the pt and his wife.    chronic , noncompliance with treatment
switching to remeron at 7.5mg   ativan 0,5mg po BID prn for anxiety 
acute : Low risk, pt fearful of pain of dying,Pt with improved mood and affect   mitigating: denies any intent or plan   protective : has a wife, place to live, support of children, family claims to have obtained a live in aide for the pt and his wife.    chronic , noncompliance with treatment  
switching to remeron at 7.5mg   ativan 0,5mg po BID prn for anxiety 
acute : Low risk, pt fearful of pin of death  mitigating: denies any intent or plan   protective : has a wife, place to live, support of children   chronic , noncompliance with treatment

## 2023-05-16 NOTE — BH INPATIENT PSYCHIATRY PROGRESS NOTE - NSTXDEPRESINTERMD_PSY_ALL_CORE
pt started zoloft
pt on abilify for mood and affect stability
pt started zoloft
pt on abilify for mood and affect stability

## 2023-05-18 DIAGNOSIS — G47.00 INSOMNIA, UNSPECIFIED: ICD-10-CM

## 2023-05-18 DIAGNOSIS — R45.851 SUICIDAL IDEATIONS: ICD-10-CM

## 2023-05-18 DIAGNOSIS — K21.00 GASTRO-ESOPHAGEAL REFLUX DISEASE WITH ESOPHAGITIS, WITHOUT BLEEDING: ICD-10-CM

## 2023-05-18 DIAGNOSIS — C18.9 MALIGNANT NEOPLASM OF COLON, UNSPECIFIED: ICD-10-CM

## 2023-05-18 DIAGNOSIS — K22.2 ESOPHAGEAL OBSTRUCTION: ICD-10-CM

## 2023-05-18 DIAGNOSIS — I25.10 ATHEROSCLEROTIC HEART DISEASE OF NATIVE CORONARY ARTERY WITHOUT ANGINA PECTORIS: ICD-10-CM

## 2023-05-18 DIAGNOSIS — I48.91 UNSPECIFIED ATRIAL FIBRILLATION: ICD-10-CM

## 2023-05-18 DIAGNOSIS — F32.9 MAJOR DEPRESSIVE DISORDER, SINGLE EPISODE, UNSPECIFIED: ICD-10-CM

## 2023-05-18 DIAGNOSIS — Z86.73 PERSONAL HISTORY OF TRANSIENT ISCHEMIC ATTACK (TIA), AND CEREBRAL INFARCTION WITHOUT RESIDUAL DEFICITS: ICD-10-CM

## 2023-05-18 DIAGNOSIS — F41.1 GENERALIZED ANXIETY DISORDER: ICD-10-CM

## 2023-10-12 NOTE — BH INPATIENT PSYCHIATRY PROGRESS NOTE - NSTXSLPPATDATETRGT_PSY_ALL_CORE
10-May-2023
done
10-May-2023
